# Patient Record
Sex: FEMALE | Race: WHITE | Employment: OTHER | URBAN - METROPOLITAN AREA
[De-identification: names, ages, dates, MRNs, and addresses within clinical notes are randomized per-mention and may not be internally consistent; named-entity substitution may affect disease eponyms.]

---

## 2017-04-18 ENCOUNTER — HOSPITAL ENCOUNTER (OUTPATIENT)
Dept: GENERAL RADIOLOGY | Age: 77
Discharge: HOME | End: 2017-04-18
Payer: MEDICARE

## 2017-04-18 DIAGNOSIS — M05.79 RHEUMATOID ARTHRITIS INVOLVING MULTIPLE SITES WITH POSITIVE RHEUMATOID FACTOR (HCC): ICD-10-CM

## 2017-04-18 DIAGNOSIS — Z79.899 ENCOUNTER FOR LONG-TERM (CURRENT) USE OF OTHER MEDICATIONS: ICD-10-CM

## 2017-04-18 PROCEDURE — 71020 XR CHEST PA LAT: CPT

## 2017-04-18 PROCEDURE — 73120 X-RAY EXAM OF HAND: CPT

## 2018-03-22 ENCOUNTER — HOSPITAL ENCOUNTER (EMERGENCY)
Age: 78
Discharge: HOME | End: 2018-03-22
Attending: EMERGENCY MEDICINE
Payer: MEDICARE

## 2018-03-22 VITALS
TEMPERATURE: 98.2 F | DIASTOLIC BLOOD PRESSURE: 91 MMHG | RESPIRATION RATE: 18 BRPM | OXYGEN SATURATION: 95 % | WEIGHT: 156 LBS | SYSTOLIC BLOOD PRESSURE: 175 MMHG | HEART RATE: 92 BPM | HEIGHT: 60 IN | BODY MASS INDEX: 30.63 KG/M2

## 2018-03-22 DIAGNOSIS — R79.89 ELEVATED SERUM CREATININE: ICD-10-CM

## 2018-03-22 DIAGNOSIS — R80.9 PROTEINURIA, UNSPECIFIED TYPE: ICD-10-CM

## 2018-03-22 DIAGNOSIS — I10 ELEVATED BLOOD PRESSURE READING WITH DIAGNOSIS OF HYPERTENSION: ICD-10-CM

## 2018-03-22 DIAGNOSIS — N30.01 ACUTE CYSTITIS WITH HEMATURIA: Primary | ICD-10-CM

## 2018-03-22 LAB
ANION GAP SERPL CALC-SCNC: 7 MMOL/L (ref 3–18)
APPEARANCE UR: CLEAR
BACTERIA URNS QL MICRO: ABNORMAL /HPF
BASOPHILS # BLD: 0 K/UL (ref 0–0.1)
BASOPHILS NFR BLD: 0 % (ref 0–2)
BILIRUB UR QL: NEGATIVE
BUN SERPL-MCNC: 16 MG/DL (ref 7–18)
BUN/CREAT SERPL: 28 (ref 12–20)
CALCIUM SERPL-MCNC: 9.2 MG/DL (ref 8.5–10.1)
CHLORIDE SERPL-SCNC: 109 MMOL/L (ref 100–108)
CO2 SERPL-SCNC: 26 MMOL/L (ref 21–32)
COLOR UR: YELLOW
CREAT SERPL-MCNC: 0.57 MG/DL (ref 0.6–1.3)
DIFFERENTIAL METHOD BLD: NORMAL
EOSINOPHIL # BLD: 0.1 K/UL (ref 0–0.4)
EOSINOPHIL NFR BLD: 3 % (ref 0–5)
EPITH CASTS URNS QL MICRO: ABNORMAL /LPF (ref 0–5)
ERYTHROCYTE [DISTWIDTH] IN BLOOD BY AUTOMATED COUNT: 13.7 % (ref 11.6–14.5)
GLUCOSE SERPL-MCNC: 85 MG/DL (ref 74–99)
GLUCOSE UR STRIP.AUTO-MCNC: NEGATIVE MG/DL
HCT VFR BLD AUTO: 42.5 % (ref 35–45)
HGB BLD-MCNC: 13.6 G/DL (ref 12–16)
HGB UR QL STRIP: ABNORMAL
KETONES UR QL STRIP.AUTO: NEGATIVE MG/DL
LEUKOCYTE ESTERASE UR QL STRIP.AUTO: ABNORMAL
LYMPHOCYTES # BLD: 1.1 K/UL (ref 0.9–3.6)
LYMPHOCYTES NFR BLD: 21 % (ref 21–52)
MCH RBC QN AUTO: 29.6 PG (ref 24–34)
MCHC RBC AUTO-ENTMCNC: 32 G/DL (ref 31–37)
MCV RBC AUTO: 92.4 FL (ref 74–97)
MONOCYTES # BLD: 0.4 K/UL (ref 0.05–1.2)
MONOCYTES NFR BLD: 7 % (ref 3–10)
NEUTS SEG # BLD: 3.6 K/UL (ref 1.8–8)
NEUTS SEG NFR BLD: 69 % (ref 40–73)
NITRITE UR QL STRIP.AUTO: NEGATIVE
PH UR STRIP: 5 [PH] (ref 5–8)
PLATELET # BLD AUTO: 259 K/UL (ref 135–420)
PMV BLD AUTO: 10.2 FL (ref 9.2–11.8)
POTASSIUM SERPL-SCNC: 3.9 MMOL/L (ref 3.5–5.5)
PROT UR STRIP-MCNC: ABNORMAL MG/DL
RBC # BLD AUTO: 4.6 M/UL (ref 4.2–5.3)
RBC #/AREA URNS HPF: ABNORMAL /HPF (ref 0–5)
SODIUM SERPL-SCNC: 142 MMOL/L (ref 136–145)
SP GR UR REFRACTOMETRY: 1.01 (ref 1–1.03)
UROBILINOGEN UR QL STRIP.AUTO: 0.2 EU/DL (ref 0.2–1)
WBC # BLD AUTO: 5.3 K/UL (ref 4.6–13.2)
WBC URNS QL MICRO: ABNORMAL /HPF (ref 0–4)

## 2018-03-22 PROCEDURE — 85025 COMPLETE CBC W/AUTO DIFF WBC: CPT | Performed by: PHYSICIAN ASSISTANT

## 2018-03-22 PROCEDURE — 81001 URINALYSIS AUTO W/SCOPE: CPT | Performed by: PHYSICIAN ASSISTANT

## 2018-03-22 PROCEDURE — 80048 BASIC METABOLIC PNL TOTAL CA: CPT | Performed by: EMERGENCY MEDICINE

## 2018-03-22 PROCEDURE — 99284 EMERGENCY DEPT VISIT MOD MDM: CPT

## 2018-03-22 PROCEDURE — 93005 ELECTROCARDIOGRAM TRACING: CPT

## 2018-03-22 PROCEDURE — 74011250637 HC RX REV CODE- 250/637: Performed by: PHYSICIAN ASSISTANT

## 2018-03-22 RX ORDER — DILTIAZEM HYDROCHLORIDE 240 MG/1
240 CAPSULE, COATED, EXTENDED RELEASE ORAL DAILY
Status: DISCONTINUED | OUTPATIENT
Start: 2018-03-22 | End: 2018-03-22

## 2018-03-22 RX ORDER — NITROFURANTOIN MACROCRYSTALS 50 MG/1
100 CAPSULE ORAL
Status: COMPLETED | OUTPATIENT
Start: 2018-03-22 | End: 2018-03-22

## 2018-03-22 RX ORDER — NITROFURANTOIN 25; 75 MG/1; MG/1
100 CAPSULE ORAL 2 TIMES DAILY
Qty: 10 CAP | Refills: 0 | Status: SHIPPED | OUTPATIENT
Start: 2018-03-22 | End: 2018-03-27

## 2018-03-22 RX ORDER — DILTIAZEM HYDROCHLORIDE 240 MG/1
240 CAPSULE, COATED, EXTENDED RELEASE ORAL
Status: COMPLETED | OUTPATIENT
Start: 2018-03-22 | End: 2018-03-22

## 2018-03-22 RX ADMIN — NITROFURANTOIN MACROCRYSTALS 100 MG: 50 CAPSULE ORAL at 13:00

## 2018-03-22 RX ADMIN — DILTIAZEM HYDROCHLORIDE 240 MG: 240 CAPSULE, COATED, EXTENDED RELEASE ORAL at 13:01

## 2018-03-22 NOTE — ED PROVIDER NOTES
EMERGENCY DEPARTMENT HISTORY AND PHYSICAL EXAM    Date: 3/22/2018  Patient Name: Natalie Gee    History of Presenting Illness     Chief Complaint   Patient presents with    Blood in Urine         History Provided By: Patient    Chief Complaint: Hematuria  Duration: 2 Days  Timing:  Constant  Location:   Quality: Painless. Severity: N/A  Modifying Factors: None. Associated Symptoms: urinary frequency, urgency. Additional History (Context): Pato George is a 66 y.o. female with HTN, colon cancer, hyperlipidemia, anemia, COPD who presents with 2 days of painless hematuria with urinary frequency and urgency. No dysuria, flank pain, n/v, fevers or chills. Prior h/o nephrolithiasis which was very painful unlike current episode. Reports two discrete episodes of brief light headedness, once yesterday and once today when ambulating from the stretcher. Denies CP, SOB. H/O HTN, on medications but did not take them as she came to the ED. PCP: Nakul Nayak MD    Current Facility-Administered Medications   Medication Dose Route Frequency Provider Last Rate Last Dose    dilTIAZem CD (CARDIZEM CD) capsule 240 mg  240 mg Oral NOW Ida Bradley PA-C        nitrofurantoin (MACRODANTIN) capsule 100 mg  100 mg Oral NOW Jovany Stevens PA-C         Current Outpatient Prescriptions   Medication Sig Dispense Refill    nitrofurantoin, macrocrystal-monohydrate, (MACROBID) 100 mg capsule Take 1 Cap by mouth two (2) times a day for 5 days. 10 Cap 0    nystatin (MYCOSTATIN) powder Apply  to affected area four (4) times daily. 1 Bottle 4    WELCHOL 3.75 gram pwpk powder packet       CARTIA  mg ER capsule       loperamide (IMODIUM) 2 mg capsule       nystatin (MYCOSTATIN) powder Apply  to affected area four (4) times daily. 1 Bottle 2    nystatin (MYCOSTATIN) powder Apply  to affected area four (4) times daily. 1 Bottle 2    aspirin 81 mg chewable tablet Take 1 tablet by mouth daily.  30 tablet 0    tamsulosin (FLOMAX) 0.4 mg capsule Take 1 capsule by mouth daily. 14 capsule 0    acetaminophen (TYLENOL) 325 mg tablet Take 2 tablets by mouth every four (4) hours as needed. 30 tablet 0    pravastatin (PRAVACHOL) 40 mg tablet Take 80 mg by mouth daily.  budesonide-formoterol (SYMBICORT) 80-4.5 mcg/actuation HFAA inhaler Take 2 puffs by inhalation two (2) times a day.  albuterol (PROVENTIL VENTOLIN) 2.5 mg /3 mL (0.083 %) nebulizer solution Take  by inhalation every four (4) hours as needed. Past History     Past Medical History:  Past Medical History:   Diagnosis Date    Anemia     Bleeding     Cancer ([facility] [address]     Colon Cancer: Surgery 6/2013    COPD (chronic obstructive pulmonary disease) (HCC)     Essential (primary) hypertension     High cholesterol     Hyperlipidemia     Other ill-defined conditions(799.89)     internal bleeding per pt    SOB (shortness of breath)     Stool color black        Past Surgical History:  Past Surgical History:   Procedure Laterality Date    ENDOSCOPY, COLON, DIAGNOSTIC      HX COLECTOMY      lap colon resection 6/4/13    HX HEENT      cataract rt and lt    HX ORTHOPAEDIC      rotator cuff repair to rt shoulder    HX OTHER SURGICAL      susan cataract sx    HX UROLOGICAL      bladder tack       Family History:  Family History   Problem Relation Age of Onset    Heart Disease Mother     Heart Attack Mother     Heart Disease Father     Diabetes Father     Cancer Brother     Heart Attack Brother        Social History:  Social History   Substance Use Topics    Smoking status: Former Smoker     Quit date: 5/15/2008    Smokeless tobacco: Never Used    Alcohol use No       Allergies:  No Known Allergies      Review of Systems   Review of Systems   Constitutional: Negative for appetite change, chills and fever. HENT: Negative for sore throat. Eyes: Negative for visual disturbance. Respiratory: Negative for shortness of breath.     Cardiovascular: Negative for chest pain. Gastrointestinal: Negative for abdominal pain, nausea and vomiting. Genitourinary: Positive for frequency, hematuria and urgency. Negative for dysuria and flank pain. Musculoskeletal: Negative for myalgias. Skin: Negative for pallor. Neurological: Positive for light-headedness. Negative for headaches. All Other Systems Negative  Physical Exam     Vitals:    03/22/18 0840 03/22/18 0853 03/22/18 1011   BP: (!) 214/88  140/85   Pulse: 91     Resp: 18     Temp: 98.2 °F (36.8 °C)     SpO2: 96% 98%    Weight: 70.8 kg (156 lb)     Height: 5' (1.524 m)       Physical Exam   Constitutional: She is oriented to person, place, and time. She appears well-developed and well-nourished. No distress. HENT:   Head: Normocephalic and atraumatic. Right Ear: External ear normal.   Left Ear: External ear normal.   Nose: Nose normal.   Eyes: Conjunctivae are normal.   Neck: Normal range of motion. Cardiovascular: Normal rate and regular rhythm. Pulmonary/Chest: Effort normal and breath sounds normal. No respiratory distress. Abdominal: Soft. Bowel sounds are normal. She exhibits no distension. There is no tenderness. There is no rebound and no guarding. Neurological: She is alert and oriented to person, place, and time. CN II-XII grossly intact. Normal heel to shin. Acting appropriately and follows commands. BUE and BLE strength equal and symmetric. Skin: Skin is warm and dry. She is not diaphoretic. Psychiatric: She has a normal mood and affect.  Her behavior is normal.        Diagnostic Study Results     Labs -     Recent Results (from the past 12 hour(s))   URINALYSIS W/ RFLX MICROSCOPIC    Collection Time: 03/22/18  8:45 AM   Result Value Ref Range    Color YELLOW      Appearance CLEAR      Specific gravity 1.006 1.005 - 1.030      pH (UA) 5.0 5.0 - 8.0      Protein TRACE (A) NEG mg/dL    Glucose NEGATIVE  NEG mg/dL    Ketone NEGATIVE  NEG mg/dL    Bilirubin NEGATIVE  NEG Blood LARGE (A) NEG      Urobilinogen 0.2 0.2 - 1.0 EU/dL    Nitrites NEGATIVE  NEG      Leukocyte Esterase TRACE (A) NEG     URINE MICROSCOPIC ONLY    Collection Time: 03/22/18  8:45 AM   Result Value Ref Range    WBC 0 to 4 0 - 4 /hpf    RBC TOO NUMEROUS TO COUNT 0 - 5 /hpf    Epithelial cells FEW 0 - 5 /lpf    Bacteria FEW (A) NEG /hpf   CBC WITH AUTOMATED DIFF    Collection Time: 03/22/18  9:30 AM   Result Value Ref Range    WBC 5.3 4.6 - 13.2 K/uL    RBC 4.60 4.20 - 5.30 M/uL    HGB 13.6 12.0 - 16.0 g/dL    HCT 42.5 35.0 - 45.0 %    MCV 92.4 74.0 - 97.0 FL    MCH 29.6 24.0 - 34.0 PG    MCHC 32.0 31.0 - 37.0 g/dL    RDW 13.7 11.6 - 14.5 %    PLATELET 070 274 - 023 K/uL    MPV 10.2 9.2 - 11.8 FL    NEUTROPHILS 69 40 - 73 %    LYMPHOCYTES 21 21 - 52 %    MONOCYTES 7 3 - 10 %    EOSINOPHILS 3 0 - 5 %    BASOPHILS 0 0 - 2 %    ABS. NEUTROPHILS 3.6 1.8 - 8.0 K/UL    ABS. LYMPHOCYTES 1.1 0.9 - 3.6 K/UL    ABS. MONOCYTES 0.4 0.05 - 1.2 K/UL    ABS. EOSINOPHILS 0.1 0.0 - 0.4 K/UL    ABS.  BASOPHILS 0.0 0.0 - 0.1 K/UL    DF AUTOMATED     METABOLIC PANEL, BASIC    Collection Time: 03/22/18 10:30 AM   Result Value Ref Range    Sodium 142 136 - 145 mmol/L    Potassium 3.9 3.5 - 5.5 mmol/L    Chloride 109 (H) 100 - 108 mmol/L    CO2 26 21 - 32 mmol/L    Anion gap 7 3.0 - 18 mmol/L    Glucose 85 74 - 99 mg/dL    BUN 16 7.0 - 18 MG/DL    Creatinine 0.57 (L) 0.6 - 1.3 MG/DL    BUN/Creatinine ratio 28 (H) 12 - 20      GFR est AA >60 >60 ml/min/1.73m2    GFR est non-AA >60 >60 ml/min/1.73m2    Calcium 9.2 8.5 - 10.1 MG/DL   EKG, 12 LEAD, INITIAL    Collection Time: 03/22/18 11:26 AM   Result Value Ref Range    Ventricular Rate 82 BPM    Atrial Rate 82 BPM    P-R Interval 130 ms    QRS Duration 68 ms    Q-T Interval 374 ms    QTC Calculation (Bezet) 436 ms    Calculated P Axis 46 degrees    Calculated R Axis 15 degrees    Calculated T Axis 54 degrees    Diagnosis       Normal sinus rhythm  Normal ECG  When compared with ECG of 22-AUG-2014 06:56,  QT has shortened         Radiologic Studies -   No orders to display     CT Results  (Last 48 hours)    None        CXR Results  (Last 48 hours)    None            Medical Decision Making   I am the first provider for this patient. I reviewed the vital signs, available nursing notes, past medical history, past surgical history, family history and social history. Vital Signs-Reviewed the patient's vital signs. Pulse Oximetry Analysis - 98% on RA  EKG: Interpreted by the EP Dr. Carollynn Schilder. Time Interpreted: 9305. Rate: 82.   Rhythm: NSR. Interpretation: No STEMI. Records Reviewed: Nursing Notes, Old Medical Records and Previous Laboratory Studies    Procedures:  Procedures    Provider Notes (Medical Decision Making): Pt with hematuria, urinary frequency and urgency x 2 days. UA indicative of infection. Afebrile, no leukocytosis, no CVA tenderness. Will treat and advise PCP f/u. Pt with 2 episodes of light headedness, last when ambulating off stretcher. Non-focal neurologic exam. H/H stable. EKG, BMP pending. 11:37 AM BMP without electrolyte derangement. Pt initially hypertensive at 214/88, improved to 140/85 without intervention. She did not take her morning meds. Will advise she resume all meds as previously prescribed and advise PCP follow up for further eval.     11:37 AM Pt reassessed. She is still pain free. No light headedness since being in the ED. Will discharge to home with abx for acute cystitis with hematuria and advise PCP, urology follow up. MED RECONCILIATION:  Current Facility-Administered Medications   Medication Dose Route Frequency    dilTIAZem CD (CARDIZEM CD) capsule 240 mg  240 mg Oral NOW    nitrofurantoin (MACRODANTIN) capsule 100 mg  100 mg Oral NOW     Current Outpatient Prescriptions   Medication Sig    nitrofurantoin, macrocrystal-monohydrate, (MACROBID) 100 mg capsule Take 1 Cap by mouth two (2) times a day for 5 days.     nystatin (MYCOSTATIN) powder Apply  to affected area four (4) times daily.  WELCHOL 3.75 gram pwpk powder packet     CARTIA  mg ER capsule     loperamide (IMODIUM) 2 mg capsule     nystatin (MYCOSTATIN) powder Apply  to affected area four (4) times daily.  nystatin (MYCOSTATIN) powder Apply  to affected area four (4) times daily.  aspirin 81 mg chewable tablet Take 1 tablet by mouth daily.  tamsulosin (FLOMAX) 0.4 mg capsule Take 1 capsule by mouth daily.  acetaminophen (TYLENOL) 325 mg tablet Take 2 tablets by mouth every four (4) hours as needed.  pravastatin (PRAVACHOL) 40 mg tablet Take 80 mg by mouth daily.  budesonide-formoterol (SYMBICORT) 80-4.5 mcg/actuation HFAA inhaler Take 2 puffs by inhalation two (2) times a day.  albuterol (PROVENTIL VENTOLIN) 2.5 mg /3 mL (0.083 %) nebulizer solution Take  by inhalation every four (4) hours as needed. Disposition:  Discharge. DISCHARGE NOTE:   11:37 AM  Pt has been reexamined. Patient has no new complaints, changes, or physical findings. BP still elevated, will give anti-hypertensive here prior to discharge, she was advised to not take her medicine today when she gets home for such. Will give first dose abx here. Care plan outlined and precautions discussed. Results of UA, labs, EKG were reviewed with the patient. All medications were reviewed with the patient; will d/c home with Macrobid. All of pt's questions and concerns were addressed. Patient was instructed and agrees to follow up with PCP and urologist, as well as to return to the ED upon further deterioration. She has an appointment with PCP 4/3/2018. Patient is ready to go home. Follow-up Information     Follow up With Details Comments Alva Alicea MD Call today to make a follow up appointment early next week.  [address] [facility] EMERGENCY DEPT  As needed, If symptoms worsen [address], MD  For further evaluation of blood in urine, As needed [address] [facility]  [address]  [place] [address]  [phone]            Current Discharge Medication List      START taking these medications    Details   nitrofurantoin, macrocrystal-monohydrate, (MACROBID) 100 mg capsule Take 1 Cap by mouth two (2) times a day for 5 days. Qty: 10 Cap, Refills: 0             Diagnosis     Clinical Impression:   1. Acute cystitis with hematuria    2. Elevated blood pressure reading with diagnosis of hypertension    3. Proteinuria, unspecified type    4.  Elevated serum creatinine      Charlotte Haque PA-C 11:36 AM

## 2018-03-22 NOTE — ED TRIAGE NOTES
The patient presents for evaluation of hematuria that began last night. She is also complaining of lower back pain.

## 2018-03-23 LAB
ATRIAL RATE: 82 BPM
CALCULATED P AXIS, ECG09: 46 DEGREES
CALCULATED R AXIS, ECG10: 15 DEGREES
CALCULATED T AXIS, ECG11: 54 DEGREES
DIAGNOSIS, 93000: NORMAL
P-R INTERVAL, ECG05: 130 MS
Q-T INTERVAL, ECG07: 374 MS
QRS DURATION, ECG06: 68 MS
QTC CALCULATION (BEZET), ECG08: 436 MS
VENTRICULAR RATE, ECG03: 82 BPM

## 2018-06-26 ENCOUNTER — HOSPITAL ENCOUNTER (EMERGENCY)
Age: 78
Discharge: HOME | End: 2018-06-26
Attending: EMERGENCY MEDICINE
Payer: MEDICARE

## 2018-06-26 ENCOUNTER — APPOINTMENT (OUTPATIENT)
Dept: GENERAL RADIOLOGY | Age: 78
End: 2018-06-26
Attending: STUDENT IN AN ORGANIZED HEALTH CARE EDUCATION/TRAINING PROGRAM
Payer: MEDICARE

## 2018-06-26 ENCOUNTER — APPOINTMENT (OUTPATIENT)
Dept: CT IMAGING | Age: 78
End: 2018-06-26
Attending: EMERGENCY MEDICINE
Payer: MEDICARE

## 2018-06-26 VITALS
TEMPERATURE: 97 F | RESPIRATION RATE: 28 BRPM | BODY MASS INDEX: 27.42 KG/M2 | HEIGHT: 59 IN | HEART RATE: 102 BPM | WEIGHT: 136 LBS | OXYGEN SATURATION: 90 % | SYSTOLIC BLOOD PRESSURE: 139 MMHG | DIASTOLIC BLOOD PRESSURE: 64 MMHG

## 2018-06-26 DIAGNOSIS — R06.02 SOB (SHORTNESS OF BREATH): Primary | ICD-10-CM

## 2018-06-26 LAB
ANION GAP SERPL CALC-SCNC: 7 MMOL/L (ref 3–18)
ATRIAL RATE: 75 BPM
BASOPHILS # BLD: 0.2 K/UL (ref 0–0.06)
BASOPHILS NFR BLD: 1 % (ref 0–3)
BUN SERPL-MCNC: 18 MG/DL (ref 7–18)
BUN/CREAT SERPL: 14 (ref 12–20)
CALCIUM SERPL-MCNC: 9.5 MG/DL (ref 8.5–10.1)
CALCULATED P AXIS, ECG09: 36 DEGREES
CALCULATED R AXIS, ECG10: 26 DEGREES
CALCULATED T AXIS, ECG11: 58 DEGREES
CHLORIDE SERPL-SCNC: 102 MMOL/L (ref 100–108)
CK MB CFR SERPL CALC: 2.3 % (ref 0–4)
CK MB SERPL-MCNC: 1.2 NG/ML (ref 5–25)
CK SERPL-CCNC: 53 U/L (ref 26–192)
CO2 SERPL-SCNC: 27 MMOL/L (ref 21–32)
CREAT SERPL-MCNC: 1.25 MG/DL (ref 0.6–1.3)
D DIMER PPP FEU-MCNC: 1.37 UG/ML(FEU)
DIAGNOSIS, 93000: NORMAL
DIFFERENTIAL METHOD BLD: ABNORMAL
EOSINOPHIL # BLD: 0 K/UL (ref 0–0.4)
EOSINOPHIL NFR BLD: 0 % (ref 0–5)
ERYTHROCYTE [DISTWIDTH] IN BLOOD BY AUTOMATED COUNT: 13.8 % (ref 11.6–14.5)
GLUCOSE SERPL-MCNC: 124 MG/DL (ref 74–99)
HCT VFR BLD AUTO: 33.4 % (ref 35–45)
HGB BLD-MCNC: 10.8 G/DL (ref 12–16)
LYMPHOCYTES # BLD: 1.1 K/UL (ref 0.8–3.5)
LYMPHOCYTES NFR BLD: 7 % (ref 20–51)
MCH RBC QN AUTO: 28.7 PG (ref 24–34)
MCHC RBC AUTO-ENTMCNC: 32.3 G/DL (ref 31–37)
MCV RBC AUTO: 88.8 FL (ref 74–97)
MONOCYTES # BLD: 0.6 K/UL (ref 0–1)
MONOCYTES NFR BLD: 4 % (ref 2–9)
NEUTS BAND NFR BLD MANUAL: 18 % (ref 0–5)
NEUTS SEG # BLD: 14.2 K/UL (ref 1.8–8)
NEUTS SEG NFR BLD: 70 % (ref 42–75)
P-R INTERVAL, ECG05: 134 MS
PLATELET # BLD AUTO: 281 K/UL (ref 135–420)
PLATELET COMMENTS,PCOM: ABNORMAL
PMV BLD AUTO: 9.6 FL (ref 9.2–11.8)
POTASSIUM SERPL-SCNC: 4.1 MMOL/L (ref 3.5–5.5)
Q-T INTERVAL, ECG07: 380 MS
QRS DURATION, ECG06: 68 MS
QTC CALCULATION (BEZET), ECG08: 424 MS
RBC # BLD AUTO: 3.76 M/UL (ref 4.2–5.3)
RBC MORPH BLD: ABNORMAL
SODIUM SERPL-SCNC: 136 MMOL/L (ref 136–145)
TROPONIN I SERPL-MCNC: <0.02 NG/ML (ref 0–0.04)
VENTRICULAR RATE, ECG03: 75 BPM
WBC # BLD AUTO: 16.1 K/UL (ref 4.6–13.2)

## 2018-06-26 PROCEDURE — 71046 X-RAY EXAM CHEST 2 VIEWS: CPT

## 2018-06-26 PROCEDURE — 71275 CT ANGIOGRAPHY CHEST: CPT

## 2018-06-26 PROCEDURE — 74011250636 HC RX REV CODE- 250/636: Performed by: STUDENT IN AN ORGANIZED HEALTH CARE EDUCATION/TRAINING PROGRAM

## 2018-06-26 PROCEDURE — 99284 EMERGENCY DEPT VISIT MOD MDM: CPT

## 2018-06-26 PROCEDURE — 74011636320 HC RX REV CODE- 636/320: Performed by: EMERGENCY MEDICINE

## 2018-06-26 PROCEDURE — 93005 ELECTROCARDIOGRAM TRACING: CPT

## 2018-06-26 PROCEDURE — 85379 FIBRIN DEGRADATION QUANT: CPT

## 2018-06-26 PROCEDURE — 82553 CREATINE MB FRACTION: CPT

## 2018-06-26 PROCEDURE — 74011000250 HC RX REV CODE- 250: Performed by: STUDENT IN AN ORGANIZED HEALTH CARE EDUCATION/TRAINING PROGRAM

## 2018-06-26 PROCEDURE — 85025 COMPLETE CBC W/AUTO DIFF WBC: CPT

## 2018-06-26 PROCEDURE — 96374 THER/PROPH/DIAG INJ IV PUSH: CPT

## 2018-06-26 PROCEDURE — 80048 BASIC METABOLIC PNL TOTAL CA: CPT

## 2018-06-26 RX ADMIN — IOPAMIDOL 48 ML: 755 INJECTION, SOLUTION INTRAVENOUS at 16:31

## 2018-06-26 RX ADMIN — WATER 2 G: 1 INJECTION INTRAMUSCULAR; INTRAVENOUS; SUBCUTANEOUS at 17:02

## 2018-06-26 NOTE — ED PROVIDER NOTES
EMERGENCY DEPARTMENT HISTORY AND PHYSICAL EXAM      Date: 6/26/2018  Patient Name: Eunice Sevilla    History of Presenting Illness     Chief Complaint   Patient presents with    Abnormal Lab Results    Cough         History Provided By: Patient    Chief Complaint: Shortness of breath, cough  Duration: 1 week  Timing:  Progressive  Location: n/a  Quality: n/a  Severity: Moderate  Modifying Factors: none  Associated Symptoms: poor appetite, fevers and chills      Additional History (Context): Jamia Win is a 66 y.o. female with diabetes, hypertension, hyperlipidemia and COPD who presents from [facility] complaining of cough and SOB. Hx is limited as pt is poor historian and unsure of details, which are partially supplemented by [facility] documentation:   - pt reports a cough for 5 days, was seen at [facility] 2 days ago and rx with z-pack for possible PNA. - no PNA seen on CXR, however pt encuoraged to continue z-pack  - patient was feeling SOB yesterday and this morning when ambulating from bathroom to bedroom, and called [facility], which instructed pt to return  - pt O2 levels at 92% on initial visit, 88-94% on today's visit so they referred pt to ED        PCP: Scot Pearson MD    Current Facility-Administered Medications   Medication Dose Route Frequency Provider Last Rate Last Dose    cefTRIAXone (ROCEPHIN) 2 g in sterile water (preservative free) 20 mL IV syringe  2 g IntraVENous NOW Jesus Guerra MD         Current Outpatient Prescriptions   Medication Sig Dispense Refill    nystatin (MYCOSTATIN) powder Apply  to affected area four (4) times daily. 1 Bottle 4    WELCHOL 3.75 gram pwpk powder packet       CARTIA  mg ER capsule       loperamide (IMODIUM) 2 mg capsule       nystatin (MYCOSTATIN) powder Apply  to affected area four (4) times daily. 1 Bottle 2    nystatin (MYCOSTATIN) powder Apply  to affected area four (4) times daily. 1 Bottle 2    aspirin 81 mg chewable tablet Take 1 tablet by mouth daily.  27 tablet 0    tamsulosin (FLOMAX) 0.4 mg capsule Take 1 capsule by mouth daily. 14 capsule 0    acetaminophen (TYLENOL) 325 mg tablet Take 2 tablets by mouth every four (4) hours as needed. 30 tablet 0    pravastatin (PRAVACHOL) 40 mg tablet Take 80 mg by mouth daily.  budesonide-formoterol (SYMBICORT) 80-4.5 mcg/actuation HFAA inhaler Take 2 puffs by inhalation two (2) times a day.  albuterol (PROVENTIL VENTOLIN) 2.5 mg /3 mL (0.083 %) nebulizer solution Take  by inhalation every four (4) hours as needed. Past History     Past Medical History:  Past Medical History:   Diagnosis Date    Anemia     Bleeding     Cancer ([facility] [address]     Colon Cancer: Surgery 6/2013    COPD (chronic obstructive pulmonary disease) (HCC)     Essential (primary) hypertension     High cholesterol     Hyperlipidemia     Other ill-defined conditions(799.89)     internal bleeding per pt    SOB (shortness of breath)     Stool color black        Past Surgical History:  Past Surgical History:   Procedure Laterality Date    ENDOSCOPY, COLON, DIAGNOSTIC      HX COLECTOMY      lap colon resection 6/4/13    HX HEENT      cataract rt and lt    HX ORTHOPAEDIC      rotator cuff repair to rt shoulder    HX OTHER SURGICAL      susan cataract sx    HX UROLOGICAL      bladder tack       Family History:  Family History   Problem Relation Age of Onset    Heart Disease Mother     Heart Attack Mother     Heart Disease Father     Diabetes Father     Cancer Brother     Heart Attack Brother        Social History:  Social History   Substance Use Topics    Smoking status: Former Smoker     Quit date: 5/15/2008    Smokeless tobacco: Never Used    Alcohol use No       Allergies:  No Known Allergies      Review of Systems   Review of Systems   Constitutional: Positive for chills, fatigue and fever. HENT: Negative for congestion, rhinorrhea and sore throat. Eyes: Negative for photophobia and visual disturbance.    Respiratory: Positive for cough and shortness of breath. Negative for chest tightness. + hemoptysis several days ago   Cardiovascular: Negative for chest pain and leg swelling. Gastrointestinal: Negative for abdominal pain, constipation and diarrhea. Endocrine: Negative for polydipsia and polyphagia. Genitourinary: Negative for dysuria and flank pain. Musculoskeletal: Negative for arthralgias and myalgias. Skin: Negative for rash and wound. Neurological: Negative for dizziness, light-headedness and headaches. Psychiatric/Behavioral: Negative for agitation and confusion. Physical Exam     Vitals:    06/26/18 1300 06/26/18 1315 06/26/18 1330 06/26/18 1345   BP: 125/56 119/59 122/65 118/59   Pulse: 76 77 75 76   Resp: 26 27 21 21   Temp:       SpO2: 92% 92% 97% 95%   Weight:       Height:         Physical Exam   Constitutional: She appears well-nourished. No distress. HENT:   Head: Atraumatic. Mouth/Throat: Oropharynx is clear and moist.   Eyes: Conjunctivae and EOM are normal. Pupils are equal, round, and reactive to light. Neck: Normal range of motion. Neck supple. Cardiovascular: Normal rate and normal heart sounds. No murmur heard. Pulmonary/Chest: Breath sounds normal. No respiratory distress. She has no wheezes. She has no rales. Abdominal: Soft. She exhibits no distension. There is no tenderness. Musculoskeletal: Normal range of motion. She exhibits no edema or tenderness. Neurological: She is alert. No cranial nerve deficit. Coordination normal.   Skin: Skin is warm and dry. No rash noted. She is not diaphoretic. Psychiatric: She has a normal mood and affect.  Her behavior is normal. Judgment and thought content normal.         Diagnostic Study Results     Labs -     Recent Results (from the past 12 hour(s))   CBC WITH AUTOMATED DIFF    Collection Time: 06/26/18  1:08 PM   Result Value Ref Range    WBC 16.1 (H) 4.6 - 13.2 K/uL    RBC 3.76 (L) 4.20 - 5.30 M/uL    HGB 10.8 (L) 12.0 - 16.0 g/dL    HCT 33.4 (L) 35.0 - 45.0 %    MCV 88.8 74.0 - 97.0 FL    MCH 28.7 24.0 - 34.0 PG    MCHC 32.3 31.0 - 37.0 g/dL    RDW 13.8 11.6 - 14.5 %    PLATELET 597 333 - 208 K/uL    MPV 9.6 9.2 - 11.8 FL    NEUTROPHILS 70 42 - 75 %    BAND NEUTROPHILS 18 (H) 0 - 5 %    LYMPHOCYTES 7 (L) 20 - 51 %    MONOCYTES 4 2 - 9 %    EOSINOPHILS 0 0 - 5 %    BASOPHILS 1 0 - 3 %    ABS. NEUTROPHILS 14.2 (H) 1.8 - 8.0 K/UL    ABS. LYMPHOCYTES 1.1 0.8 - 3.5 K/UL    ABS. MONOCYTES 0.6 0 - 1.0 K/UL    ABS. EOSINOPHILS 0.0 0.0 - 0.4 K/UL    ABS. BASOPHILS 0.2 (H) 0.0 - 0.06 K/UL    DF MANUAL      PLATELET COMMENTS ADEQUATE PLATELETS      RBC COMMENTS NORMOCYTIC, NORMOCHROMIC     METABOLIC PANEL, BASIC    Collection Time: 06/26/18  1:08 PM   Result Value Ref Range    Sodium 136 136 - 145 mmol/L    Potassium 4.1 3.5 - 5.5 mmol/L    Chloride 102 100 - 108 mmol/L    CO2 27 21 - 32 mmol/L    Anion gap 7 3.0 - 18 mmol/L    Glucose 124 (H) 74 - 99 mg/dL    BUN 18 7.0 - 18 MG/DL    Creatinine 1.25 0.6 - 1.3 MG/DL    BUN/Creatinine ratio 14 12 - 20      GFR est AA 50 (L) >60 ml/min/1.73m2    GFR est non-AA 41 (L) >60 ml/min/1.73m2    Calcium 9.5 8.5 - 10.1 MG/DL   D DIMER    Collection Time: 06/26/18  1:08 PM   Result Value Ref Range    D DIMER 1.37 (H) <0.46 ug/ml(FEU)   CARDIAC PANEL,(CK, CKMB & TROPONIN)    Collection Time: 06/26/18  1:08 PM   Result Value Ref Range    CK 53 26 - 192 U/L    CK - MB 1.2 <3.6 ng/ml    CK-MB Index 2.3 0.0 - 4.0 %    Troponin-I, Qt. <0.02 0.0 - 0.045 NG/ML       Radiologic Studies -   XR CHEST PA LAT   Final Result      CTA CHEST W OR W WO CONT    (Results Pending)     CT Results  (Last 48 hours)    None        CXR Results  (Last 48 hours)               06/26/18 1209  XR CHEST PA LAT Final result    Impression:  IMPRESSION:       No radiographic demonstration of pneumonia. Small hiatal hernia.        Narrative:  EXAM: CHEST PA AND LATERAL       CLINICAL HISTORY/INDICATION:  shortness of breath with productive cough x5 days,   low oxygen saturation, referred to emergency department from urgent care center        COMPARISON: Chest x-ray April 18, 2017. TECHNIQUE: PA and lateral views obtained. FINDINGS:         The cardiac silhouette is normal. Small hiatal hernia demonstrated. The lungs   are clear. The costophrenic angles are sharply defined. Pulmonary vascularity   is normal. Mild disc space narrowing with marginal spurring involves the mid and   lower thoracic spine. Medical Decision Making   I am the first provider for this patient. I reviewed the vital signs, available nursing notes, past medical history, past surgical history, family history and social history. Assessment: 66 y.o F with hx of COPD, HTN, DM, HLD presenting with several days of worsening shortness of breath on exertion and cough, currently being treated for non-radiographic PNA. Suspect onging bronchitis with possible COPD exacerbation, however will recheck basic labs, CXR, and ECG to r/out ACS or other cardiopulmonary process. Doubt PE in this patient, as she is currently not hypoxic, and with no tachypnea, however in the setting of reported hemoptysis cannot exclude with PERC, and therefore will get d-dimer. Differential Diagnosis:  Asthma, Bronchitis, Pneumonia, PE, PTX, COPD, URI, CHF, ACS    Vital Signs-Reviewed the patient's vital signs. Pulse Oximetry Analysis - 97% on room air    Cardiac Monitor:  Rate: 87   Rhythm: regular, sinus    EKG: Interpreted by the EP. Time Interpreted: 13:55   Rate: 75   Rhythm: sinus, regurar   Interpretation: normal ecg   Comparison: none    Records Reviewed: Nursing Notes and Old Medical Records    Medical Decision Making/ ED Course:     12:45 PM   CXR negative for PNA. Labs pending. 2:46 PM  Labwork with elevated d-dimer, CT ordered.  Normal cardiac enzymes    3:24 PM  Decision made to escalate abx to include ceftriaxone for WBC (16) with bandemia (18), concerning for underlying infectious, possibly PNA not visualized on CXR. Patient care transferred to Dr. Meenakshi Zepeda pending CT for PE rule out. Procedures: None  Procedures      Diagnosis     Clinical Impression:   1.  SOB (shortness of breath)

## 2018-06-26 NOTE — ED NOTES
Assumed care of pt from triage. Pt reports to ED with c/o SOB and productive cough x5 days. Pt stating \"[facility] at [facility] today and they told me my oxygen saturations were low and that I needed to come here. They told me I have pneumonia and gave me a z pack. I've taken two pills already. \" pt speech clear, speaking full sentences, resp even and unlabored, sating 96% on ra. Lung sounds clear to auscultation. A&Ox4. Will continue to monitor and intervene as necessary.

## 2018-06-26 NOTE — DISCHARGE INSTRUCTIONS
Please refer to the attached discharge instructions for further home care information and indications to return to the emergency department. This may include worsening pain, difficulty breathing, persistent fevers, confusion, lethargy, or inability to eat or drink. Please take your home medications as prescribed. If you are being prescribed new medications, please discuss how to properly take your medications with the pharmacist and to review any medication side effects or interactions with medications you are already taking. If you have a primary care physician, please call their office later today to arrange a follow up appointment tomorrow. It is vitally important that you schedule a follow up appointment to ensure that your condition is stable and that you are re-assessed. If at any time you have any concerns or are unable to care for yourself please return to the emergency department. Shortness of Breath: Care Instructions  Your Care Instructions  Shortness of breath has many causes. Sometimes conditions such as anxiety can lead to shortness of breath. Some people get mild shortness of breath when they exercise. Trouble breathing also can be a symptom of a serious problem, such as asthma, lung disease, emphysema, heart problems, and pneumonia. If your shortness of breath continues, you may need tests and treatment. Watch for any changes in your breathing and other symptoms. Follow-up care is a key part of your treatment and safety. Be sure to make and go to all appointments, and call your doctor if you are having problems. It's also a good idea to know your test results and keep a list of the medicines you take. How can you care for yourself at home? · Do not smoke or allow others to smoke around you. If you need help quitting, talk to your doctor about stop-smoking programs and medicines. These can increase your chances of quitting for good. · Get plenty of rest and sleep.   · Take your medicines exactly as prescribed. Call your doctor if you think you are having a problem with your medicine. · Find healthy ways to deal with stress. ¨ Exercise daily. ¨ Get plenty of sleep. ¨ Eat regularly and well. When should you call for help? Call 911 anytime you think you may need emergency care. For example, call if:  ? · You have severe shortness of breath. ? · You have symptoms of a heart attack. These may include:  ¨ Chest pain or pressure, or a strange feeling in the chest.  ¨ Sweating. ¨ Shortness of breath. ¨ Nausea or vomiting. ¨ Pain, pressure, or a strange feeling in the back, neck, jaw, or upper belly or in one or both shoulders or arms. ¨ Lightheadedness or sudden weakness. ¨ A fast or irregular heartbeat. After you call 911, the  may tell you to chew 1 adult-strength or 2 to 4 low-dose aspirin. Wait for an ambulance. Do not try to drive yourself. ?Call your doctor now or seek immediate medical care if:  ? · Your shortness of breath gets worse or you start to wheeze. Wheezing is a high-pitched sound when you breathe. ? · You wake up at night out of breath or have to prop your head up on several pillows to breathe. ? · You are short of breath after only light activity or while at rest.   ? Watch closely for changes in your health, and be sure to contact your doctor if:  ? · You do not get better over the next 1 to 2 days. Where can you learn more? Go to http://kathy-nano.info/. Enter S780 in the search box to learn more about \"Shortness of Breath: Care Instructions. \"  Current as of: May 12, 2017  Content Version: 11.4  © 9132-5129 [employer]. Care instructions adapted under license by [employer] (which disclaims liability or warranty for this information).  If you have questions about a medical condition or this instruction, always ask your healthcare professional. [address]  any warranty or liability for your use of this information.

## 2018-06-26 NOTE — ED NOTES
Pt ambulated once around ED on portable pulse ox, pt sating 90-94%, pt denied SOB until arrival into room.

## 2018-11-13 RX ORDER — LEFLUNOMIDE 20 MG/1
20 TABLET ORAL DAILY
COMMUNITY

## 2018-11-13 RX ORDER — VITAMIN E 1000 UNIT
1000 CAPSULE ORAL DAILY
COMMUNITY

## 2018-11-13 RX ORDER — LOPERAMIDE HYDROCHLORIDE 2 MG/1
CAPSULE ORAL DAILY
COMMUNITY

## 2018-11-13 RX ORDER — CELECOXIB 200 MG/1
CAPSULE ORAL DAILY
COMMUNITY

## 2018-11-13 RX ORDER — GLUCOSAMINE SULFATE 1500 MG
POWDER IN PACKET (EA) ORAL DAILY
COMMUNITY

## 2018-11-14 ENCOUNTER — ANESTHESIA EVENT (OUTPATIENT)
Dept: ENDOSCOPY | Age: 78
End: 2018-11-14
Payer: MEDICARE

## 2018-11-15 ENCOUNTER — ANESTHESIA (OUTPATIENT)
Dept: ENDOSCOPY | Age: 78
End: 2018-11-15
Payer: MEDICARE

## 2018-11-15 ENCOUNTER — HOSPITAL ENCOUNTER (OUTPATIENT)
Age: 78
Setting detail: OUTPATIENT SURGERY
Discharge: HOME | End: 2018-11-15
Attending: INTERNAL MEDICINE | Admitting: INTERNAL MEDICINE
Payer: MEDICARE

## 2018-11-15 VITALS
SYSTOLIC BLOOD PRESSURE: 133 MMHG | WEIGHT: 144 LBS | HEIGHT: 58 IN | TEMPERATURE: 97.8 F | RESPIRATION RATE: 19 BRPM | DIASTOLIC BLOOD PRESSURE: 54 MMHG | HEART RATE: 77 BPM | OXYGEN SATURATION: 94 % | BODY MASS INDEX: 30.23 KG/M2

## 2018-11-15 PROCEDURE — 74011250636 HC RX REV CODE- 250/636: Performed by: NURSE ANESTHETIST, CERTIFIED REGISTERED

## 2018-11-15 PROCEDURE — 76040000019: Performed by: INTERNAL MEDICINE

## 2018-11-15 PROCEDURE — 76060000031 HC ANESTHESIA FIRST 0.5 HR: Performed by: INTERNAL MEDICINE

## 2018-11-15 PROCEDURE — 74011250636 HC RX REV CODE- 250/636

## 2018-11-15 RX ORDER — SODIUM CHLORIDE 0.9 % (FLUSH) 0.9 %
5-10 SYRINGE (ML) INJECTION AS NEEDED
Status: DISCONTINUED | OUTPATIENT
Start: 2018-11-15 | End: 2018-11-15 | Stop reason: HOSPADM

## 2018-11-15 RX ORDER — SODIUM CHLORIDE 0.9 % (FLUSH) 0.9 %
5-10 SYRINGE (ML) INJECTION EVERY 8 HOURS
Status: DISCONTINUED | OUTPATIENT
Start: 2018-11-15 | End: 2018-11-15 | Stop reason: HOSPADM

## 2018-11-15 RX ORDER — SODIUM CHLORIDE, SODIUM LACTATE, POTASSIUM CHLORIDE, CALCIUM CHLORIDE 600; 310; 30; 20 MG/100ML; MG/100ML; MG/100ML; MG/100ML
75 INJECTION, SOLUTION INTRAVENOUS CONTINUOUS
Status: DISCONTINUED | OUTPATIENT
Start: 2018-11-15 | End: 2018-11-15 | Stop reason: HOSPADM

## 2018-11-15 RX ORDER — PROPOFOL 10 MG/ML
INJECTION, EMULSION INTRAVENOUS AS NEEDED
Status: DISCONTINUED | OUTPATIENT
Start: 2018-11-15 | End: 2018-11-15 | Stop reason: HOSPADM

## 2018-11-15 RX ORDER — LIDOCAINE HYDROCHLORIDE 20 MG/ML
INJECTION, SOLUTION EPIDURAL; INFILTRATION; INTRACAUDAL; PERINEURAL AS NEEDED
Status: DISCONTINUED | OUTPATIENT
Start: 2018-11-15 | End: 2018-11-15 | Stop reason: HOSPADM

## 2018-11-15 RX ADMIN — PROPOFOL 30 MG: 10 INJECTION, EMULSION INTRAVENOUS at 10:27

## 2018-11-15 RX ADMIN — SODIUM CHLORIDE, SODIUM LACTATE, POTASSIUM CHLORIDE, AND CALCIUM CHLORIDE: 600; 310; 30; 20 INJECTION, SOLUTION INTRAVENOUS at 10:12

## 2018-11-15 RX ADMIN — LIDOCAINE HYDROCHLORIDE 40 MG: 20 INJECTION, SOLUTION EPIDURAL; INFILTRATION; INTRACAUDAL; PERINEURAL at 10:24

## 2018-11-15 RX ADMIN — PROPOFOL 50 MG: 10 INJECTION, EMULSION INTRAVENOUS at 10:22

## 2018-11-15 RX ADMIN — FAMOTIDINE 20 MG: 10 INJECTION, SOLUTION INTRAVENOUS at 09:34

## 2018-11-15 RX ADMIN — PROPOFOL 20 MG: 10 INJECTION, EMULSION INTRAVENOUS at 10:25

## 2018-11-15 NOTE — PERIOP NOTES
9:36 AM Patient reports drinking cup of black coffee and 2 ginger ales between [id number]. Also patient states she had sip of water at 0815 with her BP medication. Dr. Dodson , anesthesia, aware.

## 2018-11-15 NOTE — DISCHARGE INSTRUCTIONS
DISCHARGE SUMMARY from Nurse     POST-PROCEDURE INSTRUCTIONS:    Call your Physician if you:  ? Observe any excess bleeding. ? Develop a temperature over 100.5o F.  ? Experience abdominal, shoulder or chest pain. ? Notice any signs of decreased circulation or nerve impairment to an extremity such as a change in color, persistent numbness, tingling, coldness or increase in pain. ? Vomit blood or you have nausea and vomiting lasting longer than 4 hours. ? Are unable to take medications. ? Are unable to urinate within 8 hours after discharge following general anesthesia or intravenous sedation. For the next 24 hours after receiving general anesthesia or intravenous sedation, or while taking prescription Narcotics, limit your activities:  ? Do NOT drive a motor vehicle, operate hazard machinery or power tools, or perform tasks that require coordination. The medication you received during your procedure may have some effect on your mental awareness. ? Do NOT make important personal or business decisions. The medication you received during your procedure may have some effect on your mental awareness. ? Do NOT drink alcoholic beverages. These drinks do not mix well with the medications that have been given to you. ? Upon discharge from the hospital, you must be accompanied by a responsible adult. ? Resume your diet as directed by your physician. ? Resume medications as your physician has prescribed. ? Please give a list of your current medications to your Primary Care Provider. ? Please update this list whenever your medications are discontinued, doses are changed, or new medications (including over-the-counter products) are added. ? Please carry medication information at all times in case of emergency situations. These are general instructions for a healthy lifestyle:    No smoking/ No tobacco products/ Avoid exposure to second hand smoke.    Surgeon General's Warning:  Quitting smoking now greatly reduces serious risk to your health. Obesity, smoking, and a sedentary lifestyle greatly increase your risk for illness.  A healthy diet, regular physical exercise & weight monitoring are important for maintaining a healthy lifestyle   You may be retaining fluid if you have a history of heart failure or if you experience any of the following symptoms:  Weight gain of 3 pounds or more overnight or 5 pounds in a week, increased swelling in our hands or feet or shortness of breath while lying flat in bed. Please call your doctor as soon as you notice any of these symptoms; do not wait until your next office visit. Recognize signs and symptoms of STROKE:  F  -  Face looks uneven  A  -  Arms unable to move or move unevenly  S  -  Speech slurred or non-existent  T  -  Time to call 911 - as soon as signs and symptoms begin - DO NOT go back to bed or wait to see If you get better - TIME IS BRAIN. Colorectal Screening   Colorectal cancer almost always develops from precancerous polyps (abnormal growths) in the colon or rectum. Screening tests can find precancerous polyps, so that they can be removed before they turn into cancer. Screening tests can also find colorectal cancer early, when treatment works best.  Nancy Chamorro Speak with your physician about when you should begin screening and how often you should be tested. Colonoscopy: What to Expect at [address] [place] [address]  After you have a colonoscopy, you will stay at the clinic for 1 to 2 hours until the medicines wear off. Then you can go home. But you will need to arrange for a ride. Your doctor will tell you when you can eat and do your other usual activities. Your doctor will talk to you about when you will need your next colonoscopy. Your doctor can help you decide how often you need to be checked. This will depend on the results of your test and your risk for colorectal cancer. After the test, you may be bloated or have gas pains.  You may need to pass gas. If a biopsy was done or a polyp was removed, you may have streaks of blood in your stool (feces) for a few days. This care sheet gives you a general idea about how long it will take for you to recover. But each person recovers at a different pace. Follow the steps below to get better as quickly as possible. How can you care for yourself at home? Activity  · Rest when you feel tired. · You can do your normal activities when it feels okay to do so. Diet  · Follow your doctor's directions for eating. · Unless your doctor has told you not to, drink plenty of fluids. This helps to replace the fluids that were lost during the colon prep. · Do not drink alcohol. Medicines  · If polyps were removed or a biopsy was done during the test, your doctor may tell you not to take aspirin or other anti-inflammatory medicines for a few days. These include ibuprofen (Advil, Motrin) and naproxen (Aleve). Other instructions  · For your safety, do not drive or operate machinery until the medicine wears off and you can think clearly. Your doctor may tell you not to drive or operate machinery until the day after your test.  · Do not sign legal documents or make major decisions until the medicine wears off and you can think clearly. The anesthesia can make it hard for you to fully understand what you are agreeing to. Follow-up care is a key part of your treatment and safety. Be sure to make and go to all appointments, and call your doctor if you are having problems. It's also a good idea to know your test results and keep a list of the medicines you take. When should you call for help? Call 911 anytime you think you may need emergency care. For example, call if:  · You passed out (lost consciousness). · You pass maroon or bloody stools. · You have severe belly pain. Call your doctor now or seek immediate medical care if:  · Your stools are black and tarlike.   · Your stools have streaks of blood, but you did not have a biopsy or any polyps removed. · You have belly pain, or your belly is swollen and firm. · You vomit. · You have a fever. · You are very dizzy. Watch closely for changes in your health, and be sure to contact your doctor if you have any problems. Where can you learn more? Go to APROOFED.be  Enter E264 in the search box to learn more about \"Colonoscopy: What to Expect at Home. \"   © 3227-2811 [employer]. Care instructions adapted under license by [facility] (which disclaims liability or warranty for this information). This care instruction is for use with your licensed healthcare professional. If you have questions about a medical condition or this instruction, always ask your healthcare professional. [address] any warranty or liability for your use of this information.   Content Version: 74.0.221050; Current as of: November 14, 2014

## 2018-11-15 NOTE — ANESTHESIA PREPROCEDURE EVALUATION
Anesthetic History No history of anesthetic complications Review of Systems / Medical History Patient summary reviewed and pertinent labs reviewed Pulmonary COPD: moderate Neuro/Psych Within defined limits Cardiovascular Hypertension: well controlled Exercise tolerance: >4 METS 
  
GI/Hepatic/Renal 
Within defined limits Endo/Other Cancer (H/O Colon cancer) Other Findings Physical Exam 
 
Airway Mallampati: II 
TM Distance: 4 - 6 cm Neck ROM: normal range of motion Cardiovascular Regular rate and rhythm,  S1 and S2 normal,  no murmur, click, rub, or gallop Dental 
 
Dentition: Full lower dentures and Full upper dentures Pulmonary Breath sounds clear to auscultation Abdominal 
GI exam deferred Other Findings Anesthetic Plan ASA: 3 Anesthesia type: MAC Induction: Intravenous Anesthetic plan and risks discussed with: Patient

## 2018-11-15 NOTE — ANESTHESIA POSTPROCEDURE EVALUATION
Procedure(s): 
COLONOSCOPY. Anesthesia Post Evaluation Multimodal analgesia: multimodal analgesia used between 6 hours prior to anesthesia start to PACU discharge Patient location during evaluation: PACU Patient participation: complete - patient participated Level of consciousness: awake Pain score: 0 Pain management: satisfactory to patient Airway patency: patent Anesthetic complications: no 
Cardiovascular status: acceptable Respiratory status: acceptable Hydration status: acceptable Post anesthesia nausea and vomiting:  none Visit Vitals /54 Pulse 77 Temp 36.6 °C (97.8 °F) Resp 19 Ht 4' 10\" (1.473 m) Wt 65.3 kg (144 lb) SpO2 94% Breastfeeding? No  
BMI 30.10 kg/m²

## 2020-01-31 ENCOUNTER — HOSPITAL ENCOUNTER (EMERGENCY)
Age: 80
Discharge: HOME | End: 2020-01-31
Attending: EMERGENCY MEDICINE
Payer: MEDICARE

## 2020-01-31 VITALS
SYSTOLIC BLOOD PRESSURE: 152 MMHG | HEIGHT: 59 IN | DIASTOLIC BLOOD PRESSURE: 91 MMHG | HEART RATE: 92 BPM | OXYGEN SATURATION: 93 % | WEIGHT: 150 LBS | TEMPERATURE: 98.5 F | RESPIRATION RATE: 20 BRPM | BODY MASS INDEX: 30.24 KG/M2

## 2020-01-31 DIAGNOSIS — H10.31 ACUTE CONJUNCTIVITIS OF RIGHT EYE, UNSPECIFIED ACUTE CONJUNCTIVITIS TYPE: Primary | ICD-10-CM

## 2020-01-31 PROCEDURE — 74011000250 HC RX REV CODE- 250: Performed by: PHYSICIAN ASSISTANT

## 2020-01-31 PROCEDURE — 99284 EMERGENCY DEPT VISIT MOD MDM: CPT

## 2020-01-31 RX ORDER — PROPARACAINE HYDROCHLORIDE 5 MG/ML
1 SOLUTION/ DROPS OPHTHALMIC
Status: COMPLETED | OUTPATIENT
Start: 2020-01-31 | End: 2020-01-31

## 2020-01-31 RX ORDER — ERYTHROMYCIN 5 MG/G
OINTMENT OPHTHALMIC
Qty: 1 TUBE | Refills: 0 | Status: SHIPPED | OUTPATIENT
Start: 2020-01-31 | End: 2020-02-07

## 2020-01-31 RX ADMIN — FLUORESCEIN SODIUM 1 STRIP: 1 STRIP OPHTHALMIC at 12:48

## 2020-01-31 RX ADMIN — PROPARACAINE HYDROCHLORIDE 1 DROP: 5 SOLUTION/ DROPS OPHTHALMIC at 12:48

## 2020-01-31 NOTE — DISCHARGE INSTRUCTIONS
Patient Education        Pinkeye: Care Instructions  Your Care Instructions    Pinkeye is redness and swelling of the eye surface and the conjunctiva (the lining of the eyelid and the covering of the white part of the eye). Pinkeye is also called conjunctivitis. Pinkeye is often caused by infection with bacteria or a virus. Dry air, allergies, smoke, and chemicals are other common causes. Pinkeye often clears on its own in 7 to 10 days. Antibiotics only help if the pinkeye is caused by bacteria. Pinkeye caused by infection spreads easily. If an allergy or chemical is causing pinkeye, it will not go away unless you can avoid whatever is causing it. Follow-up care is a key part of your treatment and safety. Be sure to make and go to all appointments, and call your doctor if you are having problems. It's also a good idea to know your test results and keep a list of the medicines you take. How can you care for yourself at home? · Wash your hands often. Always wash them before and after you treat pinkeye or touch your eyes or face. · Use moist cotton or a clean, wet cloth to remove crust. Wipe from the inside corner of the eye to the outside. Use a clean part of the cloth for each wipe. · Put cold or warm wet cloths on your eye a few times a day if the eye hurts. · Do not wear contact lenses or eye makeup until the pinkeye is gone. Throw away any eye makeup you were using when you got pinkeye. Clean your contacts and storage case. If you wear disposable contacts, use a new pair when your eye has cleared and it is safe to wear contacts again. · If the doctor gave you antibiotic ointment or eyedrops, use them as directed. Use the medicine for as long as instructed, even if your eye starts looking better soon. Keep the bottle tip clean, and do not let it touch the eye area. · To put in eyedrops or ointment:  ? Tilt your head back, and pull your lower eyelid down with one finger. ?  Drop or squirt the medicine inside the lower lid. ? Close your eye for 30 to 60 seconds to let the drops or ointment move around. ? Do not touch the ointment or dropper tip to your eyelashes or any other surface. · Do not share towels, pillows, or washcloths while you have pinkeye. When should you call for help? Call your doctor now or seek immediate medical care if:    · You have pain in your eye, not just irritation on the surface.     · You have a change in vision or loss of vision.     · You have an increase in discharge from the eye.     · Your eye has not started to improve or begins to get worse within 48 hours after you start using antibiotics.     · Pinkeye lasts longer than 7 days.    Watch closely for changes in your health, and be sure to contact your doctor if you have any problems. Where can you learn more? Go to http://kathy-nano.info/. Enter Y392 in the search box to learn more about \"Pinkeye: Care Instructions. \"  Current as of: June 26, 2019  Content Version: 12.2  © 5522-1211 [employer]. Care instructions adapted under license by [employer] (which disclaims liability or warranty for this information). If you have questions about a medical condition or this instruction, always ask your healthcare professional. [address] any warranty or liability for your use of this information. MyChart Activation    Thank you for requesting access to [employer]. Please follow the instructions below to securely access and download your online medical record. [employer] allows you to send messages to your doctor, view your test results, renew your prescriptions, schedule appointments, and more. How Do I Sign Up? 1. In your internet browser, go to [email or web address]  2. Click on the First Time User? Click Here link in the Sign In box. You will be redirect to the New Member Sign Up page. 3. Enter your [employer] Access Code exactly as it appears below.  You will not need to use this code after youve completed the sign-up process. If you do not sign up before the expiration date, you must request a new code. [employer] Access Code: [email or web address]  Expires: 3/16/2020 12:10 PM (This is the date your [employer] access code will )    4. Enter the last four digits of your Social Security Number (xxxx) and Date of Birth (mm/dd/yyyy) as indicated and click Submit. You will be taken to the next sign-up page. 5. Create a [employer] ID. This will be your [employer] login ID and cannot be changed, so think of one that is secure and easy to remember. 6. Create a [employer] password. You can change your password at any time. 7. Enter your Password Reset Question and Answer. This can be used at a later time if you forget your password. 8. Enter your e-mail address. You will receive e-mail notification when new information is available in [address]. 9. Click Sign Up. You can now view and download portions of your medical record. 10. Click the Download Summary menu link to download a portable copy of your medical information. Additional Information    If you have questions, please visit the Frequently Asked Questions section of the [employer] website at [email or web address] [email or web address]/. Remember, [employer] is NOT to be used for urgent needs. For medical emergencies, dial 911. Complete all medications as prescribed. Follow-up with ophthalmology in 1 week. Return to the ED immediately for any new or worsening symptoms.

## 2020-01-31 NOTE — ED TRIAGE NOTES
Patient c/o swelling and possible infection to right eye. Yellow drainage noted from right eye at time of triage. She states eye symptoms began on Sunday. C/o swelling to plantar aspect of right foot. Attributes foot pain to arthritis.

## 2020-01-31 NOTE — PROGRESS NOTES
conducted a Follow up consultation and Spiritual Assessment for Deirdre Henry, who is a 78 y.o.,female. The  provided the following Interventions:  Continued the relationship of care and support. Listened empathically. Chart reviewed. The following outcomes were achieved:  Patient expressed gratitude for 's visit. Assessment:  There are no further spiritual or Jehovah's witness issues which require Spiritual Care Services interventions at this time. Plan:  Chaplains will continue to follow and will provide pastoral care on an as needed/requested basis.  recommends bedside caregivers page  on duty if patient shows signs of acute spiritual or emotional distress.        [facility]   [phone]

## 2020-01-31 NOTE — ED PROVIDER NOTES
EMERGENCY DEPARTMENT HISTORY AND PHYSICAL EXAM    Date: 1/31/2020  Patient Name: Yannick Veliz    History of Presenting Illness     Chief Complaint   Patient presents with    Eye Pain    Foot Pain         History Provided By: patient     Chief Complaint: eye pain, redness, and discharge   Duration: 5 days   Timing:acute   Location: R eye  Quality: burning   Severity:moderate   Modifying Factors: cool compress has not helped  Associated Symptoms: chronic L foot pain secondary to arthritis       Additional History (Context): Pato Jasmine is a 78 y.o. female with PMH anemia, COPD, htn, and hyperlipidemia who presents with c/o R eye pain, redness, and discharge x 5 days. Denies any known injury to the eye, fever, chills, or rash. Pt denies wearing contact lenses. She reports photophobia and blurry vision. Pt also states she has had acute on chronic L foot pain x a few days but states \"I don't want to deal with that today. \" No other complaints reported. PCP: Nathan Vazquez MD    Current Outpatient Medications   Medication Sig Dispense Refill    erythromycin (ILOTYCIN) ophthalmic ointment Apply to the inner tear duct of the affected eye 6 times daily for 7 days. 1 Tube 0    celecoxib (CELEBREX) 200 mg capsule Take  by mouth daily.  loperamide (IMODIUM) 2 mg capsule Take  by mouth daily.  leflunomide (ARAVA) 20 mg tablet Take 20 mg by mouth daily.  cholecalciferol (VITAMIN D3) 1,000 unit cap Take  by mouth daily.  ascorbic acid, vitamin C, (VITAMIN C) 1,000 mg tablet Take 1,000 mg by mouth daily.  CARTIA  mg ER capsule 240 mg daily.  aspirin 81 mg chewable tablet Take 1 tablet by mouth daily. 30 tablet 0    acetaminophen (TYLENOL) 325 mg tablet Take 2 tablets by mouth every four (4) hours as needed. 30 tablet 0    pravastatin (PRAVACHOL) 40 mg tablet Take 80 mg by mouth daily.       budesonide-formoterol (SYMBICORT) 80-4.5 mcg/actuation HFAA inhaler Take 2 puffs by inhalation two (2) times a day.  albuterol (PROVENTIL VENTOLIN) 2.5 mg /3 mL (0.083 %) nebulizer solution Take  by inhalation every four (4) hours as needed. Past History     Past Medical History:  Past Medical History:   Diagnosis Date    Anemia     Bleeding     Cancer [facility])     Colon Cancer: Surgery 2013    COPD (chronic obstructive pulmonary disease) (HCC)     Essential (primary) hypertension     High cholesterol     Hyperlipidemia     Other ill-defined conditions(799.89)     internal bleeding per pt    SOB (shortness of breath)     Stool color black        Past Surgical History:  Past Surgical History:   Procedure Laterality Date    COLONOSCOPY N/A 11/15/2018    COLONOSCOPY performed by Ernestine Gallagher MD at [facility] ENDOSCOPY    ENDOSCOPY, COLON, DIAGNOSTIC      HX COLECTOMY      lap colon resection 13    HX HEENT      cataract rt and lt    HX ORTHOPAEDIC      rotator cuff repair to rt shoulder    HX OTHER SURGICAL      susan cataract sx    HX UROLOGICAL      bladder tack       Family History:  Family History   Problem Relation Age of Onset    Heart Disease Mother     Heart Attack Mother     Heart Disease Father     Diabetes Father     Cancer Brother     Heart Attack Brother        Social History:  Social History     Tobacco Use    Smoking status: Former Smoker     Last attempt to quit: 5/15/2008     Years since quittin.7    Smokeless tobacco: Never Used   Substance Use Topics    Alcohol use: No    Drug use: No       Allergies:  No Known Allergies      Review of Systems   Review of Systems   Constitutional: Negative. Negative for chills and fever. HENT: Negative. Negative for congestion, ear pain and rhinorrhea. Eyes: Positive for pain, discharge and redness. Respiratory: Negative. Negative for cough, shortness of breath, wheezing and stridor. Cardiovascular: Negative. Negative for chest pain and leg swelling. Gastrointestinal: Negative.   Negative for abdominal pain, constipation, diarrhea, nausea and vomiting. Genitourinary: Negative. Negative for dysuria and frequency. Musculoskeletal: Positive for arthralgias. Negative for back pain and neck pain. Chronic foot pain secondary to arthritis    Skin: Negative. Negative for rash and wound. Neurological: Negative. Negative for dizziness, seizures, syncope and headaches. All other systems reviewed and are negative. All Other Systems Negative  Physical Exam     Vitals:    01/31/20 1208   BP: (!) 152/91   Pulse: 92   Resp: 20   Temp: 98.5 °F (36.9 °C)   SpO2: 93%   Weight: 68 kg (150 lb)   Height: 4' 11\" (1.499 m)     Physical Exam  Vitals signs and nursing note reviewed. Constitutional:       General: She is in acute distress. Appearance: She is well-developed. She is not diaphoretic. Comments: Mildly distressed   HENT:      Head: Normocephalic and atraumatic. Eyes:      General: No scleral icterus. Right eye: Discharge present. Left eye: No discharge. Extraocular Movements: Extraocular movements intact. Pupils: Pupils are equal, round, and reactive to light. Comments: L eye WNL  R eye: conjunctiva erythematous with yellow purulent discharge noted. Markos Aw shows a large area of dye uptake at the 6 o'clock position, negative cheikh sign. No dendritic lesions noted. Slight periorbital erythema noted, however patient has been applying cool compress to the area upon walking in the room. EOM intact and painless. Neck:      Musculoskeletal: Normal range of motion and neck supple. Cardiovascular:      Rate and Rhythm: Normal rate and regular rhythm. Heart sounds: Normal heart sounds. No murmur. No friction rub. No gallop. Pulmonary:      Effort: Pulmonary effort is normal. No respiratory distress. Breath sounds: Normal breath sounds. No stridor. No wheezing, rhonchi or rales. Musculoskeletal: Normal range of motion.    Skin:     General: Skin is warm and dry. Findings: No erythema or rash. Neurological:      Mental Status: She is alert and oriented to person, place, and time. Coordination: Coordination normal.      Comments: Gait is steady and patient exhibits no evidence of ataxia. Patient is able to ambulate without difficulty. No focal neurological deficit noted. No facial droop, slurred speech, or evidence of altered mentation noted on exam.     Psychiatric:         Behavior: Behavior normal.         Thought Content: Thought content normal.                Diagnostic Study Results     Labs -   No results found for this or any previous visit (from the past 12 hour(s)). Radiologic Studies -   No orders to display     CT Results  (Last 48 hours)    None        CXR Results  (Last 48 hours)    None            Medical Decision Making   I am the first provider for this patient. I reviewed the vital signs, available nursing notes, past medical history, past surgical history, family history and social history. Vital Signs-Reviewed the patient's vital signs. Records Reviewed: Ilda Queen PA-C     Procedures:  Procedures    Provider Notes (Medical Decision Making): Impression:  Conjunctivitis    Clinical presentation suggestive of acute conjunctivitis. No rash or dendritic lesions to suggest herpes zoster. Pt is noted to have visual changes to the R eye. She states she has an opthalmologist that she can follow-up with this week. I have discussed the signs/sx of shingles with the pt and recommendation for very close follow-up this week. Pt agrees with this plan. Ilda Queen PA-C     MED RECONCILIATION:  No current facility-administered medications for this encounter. Current Outpatient Medications   Medication Sig    erythromycin (ILOTYCIN) ophthalmic ointment Apply to the inner tear duct of the affected eye 6 times daily for 7 days.  celecoxib (CELEBREX) 200 mg capsule Take  by mouth daily.     loperamide (IMODIUM) 2 mg capsule Take  by mouth daily.  leflunomide (ARAVA) 20 mg tablet Take 20 mg by mouth daily.  cholecalciferol (VITAMIN D3) 1,000 unit cap Take  by mouth daily.  ascorbic acid, vitamin C, (VITAMIN C) 1,000 mg tablet Take 1,000 mg by mouth daily.  CARTIA  mg ER capsule 240 mg daily.  aspirin 81 mg chewable tablet Take 1 tablet by mouth daily.  acetaminophen (TYLENOL) 325 mg tablet Take 2 tablets by mouth every four (4) hours as needed.  pravastatin (PRAVACHOL) 40 mg tablet Take 80 mg by mouth daily.  budesonide-formoterol (SYMBICORT) 80-4.5 mcg/actuation HFAA inhaler Take 2 puffs by inhalation two (2) times a day.  albuterol (PROVENTIL VENTOLIN) 2.5 mg /3 mL (0.083 %) nebulizer solution Take  by inhalation every four (4) hours as needed. Disposition:  D.c    DISCHARGE NOTE:   Patient is stable for discharge at this time. I have discussed all the findings from today's work up with the patient, including lab results and imaging. I have answered all questions. Rx for erythromycin eye ointment given. Rest and close follow-up with the PCP recommended this week. Return to the ED immediately for any new or worsening symptoms. Ilda Queen PA-C     Follow-up Information     Follow up With Specialties Details Why Contact Info    your ophthalmologist  Schedule an appointment as soon as possible for a visit in 3 days      [id number]   As needed [address] [address]  [phone] [address] [facility] EMERGENCY DEPT Emergency Medicine  As needed, If symptoms worsen [address] [facility]  [phone]          Discharge Medication List as of 1/31/2020  2:07 PM      START taking these medications    Details   erythromycin (ILOTYCIN) ophthalmic ointment Apply to the inner tear duct of the affected eye 6 times daily for 7 days. , Print, Disp-1 Tube, R-0         CONTINUE these medications which have NOT CHANGED    Details   celecoxib (CELEBREX) 200 mg capsule Take  by mouth daily. , Historical Med      loperamide (IMODIUM) 2 mg capsule Take  by mouth daily. , Historical Med      leflunomide (ARAVA) 20 mg tablet Take 20 mg by mouth daily. , Historical Med      cholecalciferol (VITAMIN D3) 1,000 unit cap Take  by mouth daily. , Historical Med      ascorbic acid, vitamin C, (VITAMIN C) 1,000 mg tablet Take 1,000 mg by mouth daily. , Historical Med      CARTIA  mg ER capsule 240 mg daily. , Historical Med      aspirin 81 mg chewable tablet Take 1 tablet by mouth daily. , Print, Disp-30 tablet, R-0      acetaminophen (TYLENOL) 325 mg tablet Take 2 tablets by mouth every four (4) hours as needed. , Print, Disp-30 tablet, R-0      pravastatin (PRAVACHOL) 40 mg tablet Take 80 mg by mouth daily. , Historical Med      budesonide-formoterol (SYMBICORT) 80-4.5 mcg/actuation HFAA inhaler Take 2 puffs by inhalation two (2) times a day., Historical Med      albuterol (PROVENTIL VENTOLIN) 2.5 mg /3 mL (0.083 %) nebulizer solution Take  by inhalation every four (4) hours as needed., Historical Med                 Diagnosis     Clinical Impression:   1.  Acute conjunctivitis of right eye, unspecified acute conjunctivitis type

## 2020-09-25 ENCOUNTER — HOSPITAL ENCOUNTER (EMERGENCY)
Age: 80
Discharge: HOME | End: 2020-09-26
Attending: EMERGENCY MEDICINE
Payer: MEDICARE

## 2020-09-25 VITALS
HEART RATE: 99 BPM | DIASTOLIC BLOOD PRESSURE: 95 MMHG | SYSTOLIC BLOOD PRESSURE: 146 MMHG | OXYGEN SATURATION: 95 % | TEMPERATURE: 97.2 F | RESPIRATION RATE: 18 BRPM

## 2020-09-25 DIAGNOSIS — N30.01 ACUTE CYSTITIS WITH HEMATURIA: ICD-10-CM

## 2020-09-25 DIAGNOSIS — N17.9 AKI (ACUTE KIDNEY INJURY) (HCC): Primary | ICD-10-CM

## 2020-09-25 LAB
ALBUMIN SERPL-MCNC: 2.5 G/DL (ref 3.4–5)
ALBUMIN/GLOB SERPL: 0.4 {RATIO} (ref 0.8–1.7)
ALP SERPL-CCNC: 117 U/L (ref 45–117)
ALT SERPL-CCNC: 14 U/L (ref 13–56)
AMORPH CRY URNS QL MICRO: ABNORMAL
ANION GAP SERPL CALC-SCNC: 10 MMOL/L (ref 3–18)
ANION GAP SERPL CALC-SCNC: 9 MMOL/L (ref 3–18)
APPEARANCE UR: ABNORMAL
AST SERPL-CCNC: 10 U/L (ref 10–38)
BACTERIA URNS QL MICRO: ABNORMAL /HPF
BASOPHILS # BLD: 0 K/UL (ref 0–0.1)
BASOPHILS NFR BLD: 0 % (ref 0–2)
BILIRUB SERPL-MCNC: 0.2 MG/DL (ref 0.2–1)
BILIRUB UR QL: NEGATIVE
BUN SERPL-MCNC: 51 MG/DL (ref 7–18)
BUN SERPL-MCNC: 58 MG/DL (ref 7–18)
BUN/CREAT SERPL: 20 (ref 12–20)
BUN/CREAT SERPL: 20 (ref 12–20)
CALCIUM SERPL-MCNC: 7.5 MG/DL (ref 8.5–10.1)
CALCIUM SERPL-MCNC: 9.3 MG/DL (ref 8.5–10.1)
CHLORIDE SERPL-SCNC: 113 MMOL/L (ref 100–111)
CHLORIDE SERPL-SCNC: 119 MMOL/L (ref 100–111)
CO2 SERPL-SCNC: 18 MMOL/L (ref 21–32)
CO2 SERPL-SCNC: 20 MMOL/L (ref 21–32)
COLOR UR: YELLOW
CREAT SERPL-MCNC: 2.51 MG/DL (ref 0.6–1.3)
CREAT SERPL-MCNC: 2.9 MG/DL (ref 0.6–1.3)
DIFFERENTIAL METHOD BLD: ABNORMAL
EOSINOPHIL # BLD: 0.1 K/UL (ref 0–0.4)
EOSINOPHIL NFR BLD: 1 % (ref 0–5)
EPITH CASTS URNS QL MICRO: ABNORMAL /LPF (ref 0–5)
ERYTHROCYTE [DISTWIDTH] IN BLOOD BY AUTOMATED COUNT: 18.6 % (ref 11.6–14.5)
GLOBULIN SER CALC-MCNC: 6.1 G/DL (ref 2–4)
GLUCOSE SERPL-MCNC: 130 MG/DL (ref 74–99)
GLUCOSE SERPL-MCNC: 75 MG/DL (ref 74–99)
GLUCOSE UR STRIP.AUTO-MCNC: NEGATIVE MG/DL
GRAN CASTS URNS QL MICRO: ABNORMAL /LPF
HCT VFR BLD AUTO: 34.4 % (ref 35–45)
HGB BLD-MCNC: 10.9 G/DL (ref 12–16)
HGB UR QL STRIP: ABNORMAL
HYALINE CASTS URNS QL MICRO: ABNORMAL /LPF (ref 0–2)
KETONES UR QL STRIP.AUTO: NEGATIVE MG/DL
LEUKOCYTE ESTERASE UR QL STRIP.AUTO: ABNORMAL
LYMPHOCYTES # BLD: 1.1 K/UL (ref 0.9–3.6)
LYMPHOCYTES NFR BLD: 10 % (ref 21–52)
MAGNESIUM SERPL-MCNC: 2.1 MG/DL (ref 1.6–2.6)
MCH RBC QN AUTO: 26.1 PG (ref 24–34)
MCHC RBC AUTO-ENTMCNC: 31.7 G/DL (ref 31–37)
MCV RBC AUTO: 82.5 FL (ref 74–97)
MONOCYTES # BLD: 0.3 K/UL (ref 0.05–1.2)
MONOCYTES NFR BLD: 3 % (ref 3–10)
MUCOUS THREADS URNS QL MICRO: ABNORMAL /LPF
NEUTS SEG # BLD: 9.9 K/UL (ref 1.8–8)
NEUTS SEG NFR BLD: 86 % (ref 40–73)
NITRITE UR QL STRIP.AUTO: NEGATIVE
PH UR STRIP: 5 [PH] (ref 5–8)
PLATELET # BLD AUTO: 459 K/UL (ref 135–420)
PMV BLD AUTO: 9.5 FL (ref 9.2–11.8)
POTASSIUM SERPL-SCNC: 3.7 MMOL/L (ref 3.5–5.5)
POTASSIUM SERPL-SCNC: 4.2 MMOL/L (ref 3.5–5.5)
PROT SERPL-MCNC: 8.6 G/DL (ref 6.4–8.2)
PROT UR STRIP-MCNC: 100 MG/DL
RBC # BLD AUTO: 4.17 M/UL (ref 4.2–5.3)
RBC #/AREA URNS HPF: ABNORMAL /HPF (ref 0–5)
SODIUM SERPL-SCNC: 143 MMOL/L (ref 136–145)
SODIUM SERPL-SCNC: 146 MMOL/L (ref 136–145)
SP GR UR REFRACTOMETRY: 1.02 (ref 1–1.03)
TROPONIN I SERPL-MCNC: <0.02 NG/ML (ref 0–0.04)
UROBILINOGEN UR QL STRIP.AUTO: 0.2 EU/DL (ref 0.2–1)
WBC # BLD AUTO: 11.4 K/UL (ref 4.6–13.2)
WBC URNS QL MICRO: ABNORMAL /HPF (ref 0–4)

## 2020-09-25 PROCEDURE — 85025 COMPLETE CBC W/AUTO DIFF WBC: CPT

## 2020-09-25 PROCEDURE — 81001 URINALYSIS AUTO W/SCOPE: CPT

## 2020-09-25 PROCEDURE — 93005 ELECTROCARDIOGRAM TRACING: CPT

## 2020-09-25 PROCEDURE — 84484 ASSAY OF TROPONIN QUANT: CPT

## 2020-09-25 PROCEDURE — 80053 COMPREHEN METABOLIC PANEL: CPT

## 2020-09-25 PROCEDURE — 99283 EMERGENCY DEPT VISIT LOW MDM: CPT

## 2020-09-25 PROCEDURE — 83735 ASSAY OF MAGNESIUM: CPT

## 2020-09-25 RX ORDER — ONDANSETRON 2 MG/ML
4 INJECTION INTRAMUSCULAR; INTRAVENOUS
Status: ACTIVE | OUTPATIENT
Start: 2020-09-25 | End: 2020-09-25

## 2020-09-25 RX ORDER — CEPHALEXIN 500 MG/1
500 CAPSULE ORAL 3 TIMES DAILY
Qty: 21 CAP | Refills: 0 | Status: SHIPPED | OUTPATIENT
Start: 2020-09-25 | End: 2020-10-02

## 2020-09-25 NOTE — ED PROVIDER NOTES
EMERGENCY DEPARTMENT HISTORY AND PHYSICAL EXAM    Date: 9/25/2020  Patient Name: Anson Andrews    History of Presenting Illness     Chief Complaint   Patient presents with    Abnormal Lab Results         History Provided By: Patient and daughter (medical power of )    Chief Complaint: Abnormal lab results, concern for UTI, fatigue  Duration: 2 weeks  Timing: Gradually worsening  Location: Diffuse body  Quality: Weak  Severity: Moderate  Modifying Factors: Worse after she received an infusion for osteoporosis 2 weeks ago  Associated Symptoms: none       Additional History (Context): Anson Andrews is a [de-identified] y.o. female with a history of osteoporosis, COPD and colon cancer who presents today for history as listed above. Patient was called by her rheumatologist after having some lab work done last week stating that there is concern for decreased kidney function and UTI. Patient reports she has not felt well since she received a infusion 2 weeks ago for osteoporosis. Reports that she has not felt like doing much including eating and drinking. Reports she lives at home with a stepson. Denies any recent illness, fevers, chills, nausea, vomiting, diarrhea, constipation, urinary symptoms, abdominal pain, chest pain or shortness of breath. Denies any recent cough. PCP: Lashanda Corea DO    Current Facility-Administered Medications   Medication Dose Route Frequency Provider Last Rate Last Dose    ondansetron (ZOFRAN) injection 4 mg  4 mg IntraVENous NOW Fabiola Sheets PA        sodium chloride 0.9 % bolus infusion 1,000 mL  1,000 mL IntraVENous ONCE Wiliam Sheets, [address]        sodium chloride 0.9 % bolus infusion 1,000 mL  1,000 mL IntraVENous ONCE Wiliam Sheets, [address]         Current Outpatient Medications   Medication Sig Dispense Refill    cephALEXin (Keflex) 500 mg capsule Take 1 Cap by mouth three (3) times daily for 7 days.  21 Cap 0    celecoxib (CELEBREX) 200 mg capsule Take  by mouth daily.      loperamide (IMODIUM) 2 mg capsule Take  by mouth daily.  leflunomide (ARAVA) 20 mg tablet Take 20 mg by mouth daily.  cholecalciferol (VITAMIN D3) 1,000 unit cap Take  by mouth daily.  ascorbic acid, vitamin C, (VITAMIN C) 1,000 mg tablet Take 1,000 mg by mouth daily.  CARTIA  mg ER capsule 240 mg daily.  aspirin 81 mg chewable tablet Take 1 tablet by mouth daily. 30 tablet 0    acetaminophen (TYLENOL) 325 mg tablet Take 2 tablets by mouth every four (4) hours as needed. 30 tablet 0    pravastatin (PRAVACHOL) 40 mg tablet Take 80 mg by mouth daily.  budesonide-formoterol (SYMBICORT) 80-4.5 mcg/actuation HFAA inhaler Take 2 puffs by inhalation two (2) times a day.  albuterol (PROVENTIL VENTOLIN) 2.5 mg /3 mL (0.083 %) nebulizer solution Take  by inhalation every four (4) hours as needed.          Past History     Past Medical History:  Past Medical History:   Diagnosis Date    Anemia     Bleeding     Cancer [facility])     Colon Cancer: Surgery 6/2013    COPD (chronic obstructive pulmonary disease) (HCC)     Essential (primary) hypertension     High cholesterol     Hyperlipidemia     Other ill-defined conditions(799.89)     internal bleeding per pt    SOB (shortness of breath)     Stool color black        Past Surgical History:  Past Surgical History:   Procedure Laterality Date    COLONOSCOPY N/A 11/15/2018    COLONOSCOPY performed by Ludwig Mehta MD at [facility] ENDOSCOPY    ENDOSCOPY, COLON, DIAGNOSTIC      HX COLECTOMY      lap colon resection 6/4/13    HX HEENT      cataract rt and lt    HX ORTHOPAEDIC      rotator cuff repair to rt shoulder    HX OTHER SURGICAL      susan cataract sx    HX UROLOGICAL      bladder tack       Family History:  Family History   Problem Relation Age of Onset    Heart Disease Mother     Heart Attack Mother     Heart Disease Father     Diabetes Father     Cancer Brother     Heart Attack Brother        Social History:  Social History     Tobacco Use    Smoking status: Former Smoker     Last attempt to quit: 5/15/2008     Years since quittin.3    Smokeless tobacco: Never Used   Substance Use Topics    Alcohol use: No    Drug use: No       Allergies:  No Known Allergies      Review of Systems   Review of Systems   Constitutional: Positive for fatigue. Negative for chills and fever. HENT: Negative for congestion, rhinorrhea and sore throat. Respiratory: Negative for cough and shortness of breath. Cardiovascular: Negative for chest pain. Gastrointestinal: Negative for abdominal pain, blood in stool, constipation, diarrhea, nausea and vomiting. Genitourinary: Negative for dysuria, frequency and hematuria. Musculoskeletal: Negative for back pain and myalgias. Skin: Negative for rash and wound. Neurological: Negative for dizziness and headaches. All other systems reviewed and are negative. All Other Systems Negative  Physical Exam     Vitals:    20 1105   BP: (!) 146/95   Pulse: 99   Resp: 18   Temp: 97.2 °F (36.2 °C)   SpO2: 95%     Physical Exam  Vitals signs and nursing note reviewed. Constitutional:       General: She is not in acute distress. Appearance: She is well-developed. She is not diaphoretic. HENT:      Head: Normocephalic and atraumatic. Eyes:      Conjunctiva/sclera: Conjunctivae normal.   Neck:      Musculoskeletal: Normal range of motion and neck supple. Cardiovascular:      Rate and Rhythm: Normal rate and regular rhythm. Pulses: No decreased pulses. Heart sounds: Normal heart sounds. No systolic murmur. No diastolic murmur. Pulmonary:      Effort: Pulmonary effort is normal. No respiratory distress. Breath sounds: Normal breath sounds. No stridor, decreased air movement or transmitted upper airway sounds. No decreased breath sounds, wheezing or rhonchi. Comments: No respiratory distress, speaking in full sentences.   Chest:      Chest wall: No tenderness. Abdominal:      General: Bowel sounds are normal. There is no distension. Palpations: Abdomen is soft. Tenderness: There is no abdominal tenderness. There is no guarding or rebound. Comments: Soft and nontender   Musculoskeletal:         General: No deformity. Right lower leg: No edema. Left lower leg: No edema. Skin:     General: Skin is warm and dry. Neurological:      Mental Status: She is alert and oriented to person, place, and time. Deep Tendon Reflexes: Reflexes are normal and symmetric.            Diagnostic Study Results     Labs -     Recent Results (from the past 12 hour(s))   URINALYSIS W/ RFLX MICROSCOPIC    Collection Time: 09/25/20 11:08 AM   Result Value Ref Range    Color YELLOW      Appearance CLOUDY      Specific gravity 1.019 1.005 - 1.030      pH (UA) 5.0 5.0 - 8.0      Protein 100 (A) NEG mg/dL    Glucose Negative NEG mg/dL    Ketone Negative NEG mg/dL    Bilirubin Negative NEG      Blood SMALL (A) NEG      Urobilinogen 0.2 0.2 - 1.0 EU/dL    Nitrites Negative NEG      Leukocyte Esterase SMALL (A) NEG     URINE MICROSCOPIC ONLY    Collection Time: 09/25/20 11:08 AM   Result Value Ref Range    WBC 1 to 6 0 - 4 /hpf    RBC 3 to 5 0 - 5 /hpf    Epithelial cells 2+ 0 - 5 /lpf    Bacteria 2+ (A) NEG /hpf    Mucus FEW (A) NEG /lpf    Amorphous Crystals FEW (A) NEG      Hyaline cast 0 to 1 0 - 2 /lpf    Granular cast 1 to 2 NEG /lpf   EKG, 12 LEAD, INITIAL    Collection Time: 09/25/20 12:40 PM   Result Value Ref Range    Ventricular Rate 89 BPM    Atrial Rate 89 BPM    P-R Interval 122 ms    QRS Duration 84 ms    Q-T Interval 354 ms    QTC Calculation (Bezet) 430 ms    Calculated P Axis 62 degrees    Calculated R Axis 30 degrees    Calculated T Axis 72 degrees    Diagnosis       Normal sinus rhythm  Normal ECG  When compared with ECG of 26-JUN-2018 13:50,  No significant change was found     CBC WITH AUTOMATED DIFF    Collection Time: 09/25/20 12:50 PM   Result Value Ref Range    WBC 11.4 4.6 - 13.2 K/uL    RBC 4.17 (L) 4.20 - 5.30 M/uL    HGB 10.9 (L) 12.0 - 16.0 g/dL    HCT 34.4 (L) 35.0 - 45.0 %    MCV 82.5 74.0 - 97.0 FL    MCH 26.1 24.0 - 34.0 PG    MCHC 31.7 31.0 - 37.0 g/dL    RDW 18.6 (H) 11.6 - 14.5 %    PLATELET 389 (H) 858 - 420 K/uL    MPV 9.5 9.2 - 11.8 FL    NEUTROPHILS 86 (H) 40 - 73 %    LYMPHOCYTES 10 (L) 21 - 52 %    MONOCYTES 3 3 - 10 %    EOSINOPHILS 1 0 - 5 %    BASOPHILS 0 0 - 2 %    ABS. NEUTROPHILS 9.9 (H) 1.8 - 8.0 K/UL    ABS. LYMPHOCYTES 1.1 0.9 - 3.6 K/UL    ABS. MONOCYTES 0.3 0.05 - 1.2 K/UL    ABS. EOSINOPHILS 0.1 0.0 - 0.4 K/UL    ABS. BASOPHILS 0.0 0.0 - 0.1 K/UL    DF AUTOMATED     METABOLIC PANEL, COMPREHENSIVE    Collection Time: 09/25/20 12:50 PM   Result Value Ref Range    Sodium 143 136 - 145 mmol/L    Potassium 3.7 3.5 - 5.5 mmol/L    Chloride 113 (H) 100 - 111 mmol/L    CO2 20 (L) 21 - 32 mmol/L    Anion gap 10 3.0 - 18 mmol/L    Glucose 130 (H) 74 - 99 mg/dL    BUN 58 (H) 7.0 - 18 MG/DL    Creatinine 2.90 (H) 0.6 - 1.3 MG/DL    BUN/Creatinine ratio 20 12 - 20      GFR est AA 19 (L) >60 ml/min/1.73m2    GFR est non-AA 16 (L) >60 ml/min/1.73m2    Calcium 9.3 8.5 - 10.1 MG/DL    Bilirubin, total 0.2 0.2 - 1.0 MG/DL    ALT (SGPT) 14 13 - 56 U/L    AST (SGOT) 10 10 - 38 U/L    Alk.  phosphatase 117 45 - 117 U/L    Protein, total 8.6 (H) 6.4 - 8.2 g/dL    Albumin 2.5 (L) 3.4 - 5.0 g/dL    Globulin 6.1 (H) 2.0 - 4.0 g/dL    A-G Ratio 0.4 (L) 0.8 - 1.7     TROPONIN I    Collection Time: 09/25/20 12:50 PM   Result Value Ref Range    Troponin-I, QT <0.02 0.0 - 0.045 NG/ML   MAGNESIUM    Collection Time: 09/25/20 12:50 PM   Result Value Ref Range    Magnesium 2.1 1.6 - 2.6 mg/dL   METABOLIC PANEL, BASIC    Collection Time: 09/25/20  5:30 PM   Result Value Ref Range    Sodium 146 (H) 136 - 145 mmol/L    Potassium 4.2 3.5 - 5.5 mmol/L    Chloride 119 (H) 100 - 111 mmol/L    CO2 18 (L) 21 - 32 mmol/L    Anion gap 9 3.0 - 18 mmol/L    Glucose 75 74 - 99 mg/dL    BUN 51 (H) 7.0 - 18 MG/DL    Creatinine 2.51 (H) 0.6 - 1.3 MG/DL    BUN/Creatinine ratio 20 12 - 20      GFR est AA 22 (L) >60 ml/min/1.73m2    GFR est non-AA 18 (L) >60 ml/min/1.73m2    Calcium 7.5 (L) 8.5 - 10.1 MG/DL       Radiologic Studies -   No orders to display     CT Results  (Last 48 hours)    None        CXR Results  (Last 48 hours)    None            Medical Decision Making   I am the first provider for this patient. I reviewed the vital signs, available nursing notes, past medical history, past surgical history, family history and social history. Vital Signs-Reviewed the patient's vital signs. Records Reviewed: Nursing Notes and Old Medical Records     Procedures: None   Procedures    Provider Notes (Medical Decision Making):     Differential: Dehydration, UTI, pyelonephritis, JULIAN, kidney failure, electrolyte abnormality    Plan: We will repeat labs, and UA.      1:51 PM  Have discussed creatinine and UA findings with patient and daughter. Patient and daughter both agree on trial of rehydration repeat BMP. Have discussed with Dr. Héctor Torres  who agrees with the plan. 3:46 PM  Patient is done with her first bag, will hang second bag. Will repeat BMP after. 5:20 PM  Patient has had her second liter of fluids, will repeat BMP. Reports she is feeling much better at this time. 6:15 PM  Have discussed repeat BMP with patient and Dr. Eloisa Byers. Offered patient and daughter admission however they would prefer to go home, follow-up on Monday with repeat labs and orally hydrate at home. Patient states understanding that if her condition should worsen she would need to come back to the emergency department immediately. Daughter also states understanding. Will discharge home.       MED RECONCILIATION:  Current Facility-Administered Medications   Medication Dose Route Frequency    ondansetron (ZOFRAN) injection 4 mg  4 mg IntraVENous NOW    sodium chloride 0.9 % bolus infusion 1,000 mL  1,000 mL IntraVENous ONCE    sodium chloride 0.9 % bolus infusion 1,000 mL  1,000 mL IntraVENous ONCE     Current Outpatient Medications   Medication Sig    cephALEXin (Keflex) 500 mg capsule Take 1 Cap by mouth three (3) times daily for 7 days.  celecoxib (CELEBREX) 200 mg capsule Take  by mouth daily.  loperamide (IMODIUM) 2 mg capsule Take  by mouth daily.  leflunomide (ARAVA) 20 mg tablet Take 20 mg by mouth daily.  cholecalciferol (VITAMIN D3) 1,000 unit cap Take  by mouth daily.  ascorbic acid, vitamin C, (VITAMIN C) 1,000 mg tablet Take 1,000 mg by mouth daily.  CARTIA  mg ER capsule 240 mg daily.  aspirin 81 mg chewable tablet Take 1 tablet by mouth daily.  acetaminophen (TYLENOL) 325 mg tablet Take 2 tablets by mouth every four (4) hours as needed.  pravastatin (PRAVACHOL) 40 mg tablet Take 80 mg by mouth daily.  budesonide-formoterol (SYMBICORT) 80-4.5 mcg/actuation HFAA inhaler Take 2 puffs by inhalation two (2) times a day.  albuterol (PROVENTIL VENTOLIN) 2.5 mg /3 mL (0.083 %) nebulizer solution Take  by inhalation every four (4) hours as needed. Disposition:  Home     DISCHARGE NOTE:   Pt has been reexamined. Patient has no new complaints, changes, or physical findings. Care plan outlined and precautions discussed. Results of workup were reviewed with the patient. All medications were reviewed with the patient. All of pt's questions and concerns were addressed. Patient was instructed and agrees to follow up with PCP as well as to return to the ED upon further deterioration. Patient is ready to go home.     Follow-up Information     Follow up With Specialties Details Why Contact Info    [facility] EMERGENCY DEPT Emergency Medicine  As needed [address]  [phone]    Joslyn Dunn DO Family Medicine Schedule an appointment as soon as possible for a visit  [address] [facility] [address]   [phone]            Current Discharge Medication List      START taking these medications    Details   cephALEXin (Keflex) 500 mg capsule Take 1 Cap by mouth three (3) times daily for 7 days. Qty: 21 Cap, Refills: 0                 Diagnosis     Clinical Impression:   1. JULIAN (acute kidney injury) ([facility] [address]    2. Acute cystitis with hematuria          \"Please note that this dictation was completed with [employer], the computer voice recognition software. Quite often unanticipated grammatical, syntax, homophones, and other interpretive errors are inadvertently transcribed by the computer software. Please disregard these errors. Please excuse any errors that have escaped final proofreading. \"

## 2020-09-25 NOTE — DISCHARGE INSTRUCTIONS

## 2020-09-25 NOTE — ED NOTES
N/v x 3 days. Sent in by PMD due to decreased kidney function and possible UTI. I performed a brief evaluation, including history and physical, of the patient here in triage and I have determined that pt will need further treatment and evaluation from the main side ER physician. I have placed initial orders to help in expediting patients care.      September 25, 2020 at 11:11 AM - LUCIANA Mix        Visit Vitals  BP (!) 146/95 (BP 1 Location: Left arm, BP Patient Position: Sitting)   Pulse 99   Temp 97.2 °F (36.2 °C)   Resp 18   SpO2 95%        t

## 2020-09-26 LAB
ATRIAL RATE: 89 BPM
CALCULATED P AXIS, ECG09: 62 DEGREES
CALCULATED R AXIS, ECG10: 30 DEGREES
CALCULATED T AXIS, ECG11: 72 DEGREES
DIAGNOSIS, 93000: NORMAL
P-R INTERVAL, ECG05: 122 MS
Q-T INTERVAL, ECG07: 354 MS
QRS DURATION, ECG06: 84 MS
QTC CALCULATION (BEZET), ECG08: 430 MS
VENTRICULAR RATE, ECG03: 89 BPM

## 2020-11-04 ENCOUNTER — HOSPITAL ENCOUNTER (OUTPATIENT)
Dept: LAB | Age: 80
Discharge: HOME | End: 2020-11-04
Payer: MEDICARE

## 2020-11-04 PROCEDURE — 87635 SARS-COV-2 COVID-19 AMP PRB: CPT

## 2020-11-05 RX ORDER — LANOLIN ALCOHOL/MO/W.PET/CERES
CREAM (GRAM) TOPICAL
COMMUNITY

## 2020-11-06 LAB — SARS-COV-2, COV2NT: NOT DETECTED

## 2020-11-07 ENCOUNTER — ANESTHESIA EVENT (OUTPATIENT)
Dept: ENDOSCOPY | Age: 80
End: 2020-11-07
Payer: MEDICARE

## 2020-11-09 ENCOUNTER — HOSPITAL ENCOUNTER (OUTPATIENT)
Age: 80
Setting detail: OUTPATIENT SURGERY
Discharge: HOME | End: 2020-11-09
Attending: INTERNAL MEDICINE | Admitting: INTERNAL MEDICINE
Payer: MEDICARE

## 2020-11-09 ENCOUNTER — ANESTHESIA (OUTPATIENT)
Dept: ENDOSCOPY | Age: 80
End: 2020-11-09
Payer: MEDICARE

## 2020-11-09 VITALS
DIASTOLIC BLOOD PRESSURE: 81 MMHG | BODY MASS INDEX: 26.9 KG/M2 | HEART RATE: 69 BPM | WEIGHT: 137 LBS | TEMPERATURE: 97 F | OXYGEN SATURATION: 95 % | SYSTOLIC BLOOD PRESSURE: 154 MMHG | RESPIRATION RATE: 20 BRPM | HEIGHT: 60 IN

## 2020-11-09 PROCEDURE — 99100 ANES PT EXTEME AGE<1 YR&>70: CPT | Performed by: ANESTHESIOLOGY

## 2020-11-09 PROCEDURE — 74011250637 HC RX REV CODE- 250/637: Performed by: NURSE ANESTHETIST, CERTIFIED REGISTERED

## 2020-11-09 PROCEDURE — 00731 ANES UPR GI NDSC PX NOS: CPT | Performed by: ANESTHESIOLOGY

## 2020-11-09 PROCEDURE — 76040000019: Performed by: INTERNAL MEDICINE

## 2020-11-09 PROCEDURE — 74011250636 HC RX REV CODE- 250/636: Performed by: NURSE ANESTHETIST, CERTIFIED REGISTERED

## 2020-11-09 PROCEDURE — 00731 ANES UPR GI NDSC PX NOS: CPT | Performed by: NURSE ANESTHETIST, CERTIFIED REGISTERED

## 2020-11-09 PROCEDURE — 76060000031 HC ANESTHESIA FIRST 0.5 HR: Performed by: INTERNAL MEDICINE

## 2020-11-09 PROCEDURE — 77030019988 HC FCPS ENDOSC DISP BSC -B: Performed by: INTERNAL MEDICINE

## 2020-11-09 PROCEDURE — 2709999900 HC NON-CHARGEABLE SUPPLY: Performed by: INTERNAL MEDICINE

## 2020-11-09 PROCEDURE — 74011250636 HC RX REV CODE- 250/636: Performed by: ANESTHESIOLOGY

## 2020-11-09 PROCEDURE — 88305 TISSUE EXAM BY PATHOLOGIST: CPT

## 2020-11-09 PROCEDURE — 99100 ANES PT EXTEME AGE<1 YR&>70: CPT | Performed by: NURSE ANESTHETIST, CERTIFIED REGISTERED

## 2020-11-09 PROCEDURE — 88342 IMHCHEM/IMCYTCHM 1ST ANTB: CPT

## 2020-11-09 PROCEDURE — 77030008565 HC TBNG SUC IRR ERBE -B: Performed by: INTERNAL MEDICINE

## 2020-11-09 RX ORDER — ONDANSETRON 2 MG/ML
4 INJECTION INTRAMUSCULAR; INTRAVENOUS ONCE
Status: CANCELLED | OUTPATIENT
Start: 2020-11-09 | End: 2020-11-09

## 2020-11-09 RX ORDER — LIDOCAINE HYDROCHLORIDE 10 MG/ML
0.1 INJECTION, SOLUTION EPIDURAL; INFILTRATION; INTRACAUDAL; PERINEURAL AS NEEDED
Status: DISCONTINUED | OUTPATIENT
Start: 2020-11-09 | End: 2020-11-09 | Stop reason: HOSPADM

## 2020-11-09 RX ORDER — SODIUM CHLORIDE 0.9 % (FLUSH) 0.9 %
5-40 SYRINGE (ML) INJECTION EVERY 8 HOURS
Status: CANCELLED | OUTPATIENT
Start: 2020-11-09

## 2020-11-09 RX ORDER — FAMOTIDINE 20 MG/1
20 TABLET, FILM COATED ORAL ONCE
Status: COMPLETED | OUTPATIENT
Start: 2020-11-09 | End: 2020-11-09

## 2020-11-09 RX ORDER — SODIUM CHLORIDE 0.9 % (FLUSH) 0.9 %
5-40 SYRINGE (ML) INJECTION AS NEEDED
Status: CANCELLED | OUTPATIENT
Start: 2020-11-09

## 2020-11-09 RX ORDER — SODIUM CHLORIDE 0.9 % (FLUSH) 0.9 %
5-40 SYRINGE (ML) INJECTION EVERY 8 HOURS
Status: DISCONTINUED | OUTPATIENT
Start: 2020-11-09 | End: 2020-11-09 | Stop reason: HOSPADM

## 2020-11-09 RX ORDER — MAGNESIUM SULFATE 100 %
4 CRYSTALS MISCELLANEOUS AS NEEDED
Status: CANCELLED | OUTPATIENT
Start: 2020-11-09

## 2020-11-09 RX ORDER — SODIUM CHLORIDE, SODIUM LACTATE, POTASSIUM CHLORIDE, CALCIUM CHLORIDE 600; 310; 30; 20 MG/100ML; MG/100ML; MG/100ML; MG/100ML
75 INJECTION, SOLUTION INTRAVENOUS CONTINUOUS
Status: DISCONTINUED | OUTPATIENT
Start: 2020-11-09 | End: 2020-11-09 | Stop reason: HOSPADM

## 2020-11-09 RX ORDER — PROPOFOL 10 MG/ML
INJECTION, EMULSION INTRAVENOUS AS NEEDED
Status: DISCONTINUED | OUTPATIENT
Start: 2020-11-09 | End: 2020-11-09 | Stop reason: HOSPADM

## 2020-11-09 RX ORDER — SODIUM CHLORIDE 0.9 % (FLUSH) 0.9 %
5-40 SYRINGE (ML) INJECTION AS NEEDED
Status: DISCONTINUED | OUTPATIENT
Start: 2020-11-09 | End: 2020-11-09 | Stop reason: HOSPADM

## 2020-11-09 RX ORDER — DEXTROSE 50 % IN WATER (D50W) INTRAVENOUS SYRINGE
25-50 AS NEEDED
Status: CANCELLED | OUTPATIENT
Start: 2020-11-09

## 2020-11-09 RX ADMIN — PROPOFOL 10 MG: 10 INJECTION, EMULSION INTRAVENOUS at 12:03

## 2020-11-09 RX ADMIN — PROPOFOL 30 MG: 10 INJECTION, EMULSION INTRAVENOUS at 12:02

## 2020-11-09 RX ADMIN — PROPOFOL 30 MG: 10 INJECTION, EMULSION INTRAVENOUS at 12:01

## 2020-11-09 RX ADMIN — PROPOFOL 10 MG: 10 INJECTION, EMULSION INTRAVENOUS at 12:04

## 2020-11-09 RX ADMIN — FAMOTIDINE 20 MG: 20 TABLET, FILM COATED ORAL at 11:32

## 2020-11-09 RX ADMIN — SODIUM CHLORIDE, SODIUM LACTATE, POTASSIUM CHLORIDE, AND CALCIUM CHLORIDE 75 ML/HR: 600; 310; 30; 20 INJECTION, SOLUTION INTRAVENOUS at 11:25

## 2020-11-09 NOTE — DISCHARGE INSTRUCTIONS
DISCHARGE SUMMARY from Nurse  PATIENT INSTRUCTIONS:  After general anesthesia or intravenous sedation, for 24 hours or while taking prescription Narcotics:  · Limit your activities  · Do not drive and operate hazardous machinery  · Do not make important personal or business decisions  · Do  not drink alcoholic beverages  · If you have not urinated within 8 hours after discharge, please contact your surgeon on call. Report the following to your surgeon:  · Excessive pain, swelling, redness or odor of or around the surgical area  · Temperature over 100.5  · Nausea and vomiting lasting longer than 4 hours or if unable to take medications  · Any signs of decreased circulation or nerve impairment to extremity: change in color, persistent  numbness, tingling, coldness or increase pain  · Any questions    What to do at Home:  Recommended activity: Activity as tolerated and no driving for today. *  Please give a list of your current medications to your Primary Care Provider. *  Please update this list whenever your medications are discontinued, doses are      changed, or new medications (including over-the-counter products) are added. *  Please carry medication information at all times in case of emergency situations. These are general instructions for a healthy lifestyle:    No smoking/ No tobacco products/ Avoid exposure to second hand smoke  Surgeon General's Warning:  Quitting smoking now greatly reduces serious risk to your health. Obesity, smoking, and sedentary lifestyle greatly increases your risk for illness    A healthy diet, regular physical exercise & weight monitoring are important for maintaining a healthy lifestyle    You may be retaining fluid if you have a history of heart failure or if you experience any of the following symptoms:  Weight gain of 3 pounds or more overnight or 5 pounds in a week, increased swelling in our hands or feet or shortness of breath while lying flat in bed.   Please call your doctor as soon as you notice any of these symptoms; do not wait until your next office visit. The discharge information has been reviewed with the patient. The patient verbalized understanding. Discharge medications reviewed with the patient and appropriate educational materials and side effects teaching were provided. ___________________________________________________________________________________________________________________________________    Patient Education   Upper GI Endoscopy: What to Expect at [address] [place] [address] had an upper GI endoscopy. Your doctor used a thin, lighted tube that bends to look at the inside of your esophagus, your stomach, and the first part of the small intestine, called the duodenum. After you have an endoscopy, you will stay at the hospital or clinic for 1 to 2 hours. This will allow the medicine to wear off. You will be able to go home after your doctor or nurse checks to make sure that you're not having any problems. You may have to stay overnight if you had treatment during the test. You may have a sore throat for a day or two after the test.  This care sheet gives you a general idea about what to expect after the test.  How can you care for yourself at home? Activity   · Rest as much as you need to after you go home. · You should be able to go back to your usual activities the day after the test.  Diet   · Follow your doctor's directions for eating after the test.  · Drink plenty of fluids (unless your doctor has told you not to). Medications   · If you have a sore throat the day after the test, use an over-the-counter spray to numb your throat. Follow-up care is a key part of your treatment and safety. Be sure to make and go to all appointments, and call your doctor if you are having problems. It's also a good idea to know your test results and keep a list of the medicines you take. When should you call for help?    Call [phone] anytime you think you may need emergency care. For example, call if:    · You passed out (lost consciousness).     · You have trouble breathing.     · You pass maroon or bloody stools. Call your doctor now or seek immediate medical care if:    · You have pain that does not get better after your take pain medicine.     · You have new or worse belly pain.     · You have blood in your stools.     · You are sick to your stomach and cannot keep fluids down.     · You have a fever.     · You cannot pass stools or gas. Watch closely for changes in your health, and be sure to contact your doctor if:    · Your throat still hurts after a day or two.     · You do not get better as expected. Where can you learn more? Go to http://www.hughes.com/  Enter J454 in the search box to learn more about \"Upper GI Endoscopy: What to Expect at Home. \"  Current as of: April 15, 2020               Content Version: 12.6  © 8439-1930 [employer]. Care instructions adapted under license by [employer] (which disclaims liability or warranty for this information). If you have questions about a medical condition or this instruction, always ask your healthcare professional. [place] [address] any warranty or liability for your use of this information. Patient Education   Esophageal Dilation: What to Expect at Home  Your Recovery  After you have esophageal dilation, you will stay at the hospital or surgery center for 1 to 2 hours. This will allow the medicine to wear off. You will be able to go home after your doctor or nurse checks to make sure you are not having any problems. This care sheet gives you a general idea about how long it will take for you to recover. But each person recovers at a different pace. Follow the steps below to get better as quickly as possible. How can you care for yourself at home?   Activity    · Rest as much as you need to after you go home.     · You should be able to go back to your usual activities the day after the procedure. Diet    · Follow your doctor's directions for eating after the procedure.     · Drink plenty of fluids (unless your doctor has told you not to). Medicines    · Your doctor will tell you if and when you can restart your medicines. He or she will also give you instructions about taking any new medicines.     · If you take aspirin or some other blood thinner, ask your doctor if and when to start taking it again. Make sure that you understand exactly what your doctor wants you to do.     · If you have a sore throat the day after the procedure, use an over-the-counter spray to numb your throat. Sucking on throat lozenges and gargling with warm salt water may also help relieve your symptoms. Follow-up care is a key part of your treatment and safety. Be sure to make and go to all appointments, and call your doctor if you are having problems. It's also a good idea to know your test results and keep a list of the medicines you take. When should you call for help? Call 911 anytime you think you may need emergency care. For example, call if:    · You passed out (lost consciousness).     · You have trouble breathing.     · Your stools are maroon or very bloody   Call your doctor now or seek immediate medical care if:    · You have new or worse belly pain.     · You have a fever.     · You have new or more blood in your stools.     · You are sick to your stomach and cannot drink fluids.     · You cannot pass stools or gas.     · You have pain that does not get better after you take pain medicine. Watch closely for changes in your health, and be sure to contact your doctor if:    · Your throat still hurts after a day or two.     · You do not get better as expected. Where can you learn more? Go to http://www.gray.com/  Enter J014 in the search box to learn more about \"Esophageal Dilation: What to Expect at Home. \"  Current as of: April 15, 2020               Content Version: 12.6  © 2006-2020 [employer]. Care instructions adapted under license by [employer] (which disclaims liability or warranty for this information). If you have questions about a medical condition or this instruction, always ask your healthcare professional. [address] any warranty or liability for your use of this information. Patient Education   Peptic Ulcer Disease: Care Instructions  Your Care Instructions     Peptic ulcers are sores on the inside of the stomach or the small intestine (such as a duodenal ulcer). They are most often caused by an infection with bacteria or from use of nonsteroidal anti-inflammatory drugs (NSAIDs). NSAIDs include aspirin, ibuprofen (Advil), and naproxen (Aleve). Your doctor may have prescribed medicine to reduce stomach acid. You also may need to take antibiotics if your peptic ulcers are caused by an infection. You can help yourself heal and keep ulcers from coming back by making some changes in your lifestyle. Quit smoking. Limit alcohol. Follow-up care is a key part of your treatment and safety. Be sure to make and go to all appointments, and call your doctor if you are having problems. It's also a good idea to know your test results and keep a list of the medicines you take. How can you care for yourself at home? · Be safe with medicines. Take your medicines exactly as prescribed. Call your doctor if you think you are having a problem with your medicine. · Do not take aspirin or other NSAIDs such as ibuprofen (Advil or Motrin) or naproxen (Aleve). Ask your doctor what you can take for pain. · Do not smoke. Smoking can make ulcers worse. If you need help quitting, talk to your doctor about stop-smoking programs and medicines. These can increase your chances of quitting for good. · Drink in moderation or avoid drinking alcohol.   · Eat a balanced diet of small, frequent meals. See a dietitian if you need help planning your meals. When should you call for help? Call 911 anytime you think you may need emergency care. For example, call if:    · You vomit blood or what looks like coffee grounds.     · You pass maroon or very bloody stools. Call your doctor now or seek immediate medical care if:    · You have new or worse belly pain.     · Your stools are black and look like tar, or they have streaks of blood.     · You vomit. Watch closely for changes in your health, and be sure to contact your doctor if:    · You do not get better as expected. Where can you learn more? Go to http://www.gray.com/  Enter E788 in the search box to learn more about \"Peptic Ulcer Disease: Care Instructions. \"  Current as of: April 15, 2020               Content Version: 12.6  © 9508-9267 [employer]. Care instructions adapted under license by [employer] (which disclaims liability or warranty for this information). If you have questions about a medical condition or this instruction, always ask your healthcare professional. [address] any warranty or liability for your use of this information.

## 2020-11-09 NOTE — ANESTHESIA POSTPROCEDURE EVALUATION
Procedure(s):  UPPER ENDOSCOPY w bx's w dilation. MAC    Anesthesia Post Evaluation      Multimodal analgesia: multimodal analgesia used between 6 hours prior to anesthesia start to PACU discharge  Patient location during evaluation: bedside  Patient participation: complete - patient participated  Level of consciousness: awake and alert  Pain management: adequate  Airway patency: patent  Anesthetic complications: no  Cardiovascular status: acceptable  Respiratory status: acceptable  Hydration status: acceptable  Post anesthesia nausea and vomiting:  none      INITIAL Post-op Vital signs:   Vitals Value Taken Time   /70 11/9/2020 12:23 PM   Temp 36.7 °C (98.1 °F) 11/9/2020 12:13 PM   Pulse 68 11/9/2020 12:31 PM   Resp 29 11/9/2020 12:31 PM   SpO2 99 % 11/9/2020 12:21 PM   Vitals shown include unvalidated device data.

## 2020-11-09 NOTE — ANESTHESIA PREPROCEDURE EVALUATION
Relevant Problems   No relevant active problems       Anesthetic History   No history of anesthetic complications            Review of Systems / Medical History  Patient summary reviewed and pertinent labs reviewed    Pulmonary    COPD: mild               Neuro/Psych   Within defined limits           Cardiovascular    Hypertension: well controlled          Hyperlipidemia         GI/Hepatic/Renal         Renal disease: stones       Endo/Other        Arthritis and cancer     Other Findings              Physical Exam    Airway    TM Distance: 4 - 6 cm  Neck ROM: normal range of motion   Mouth opening: Normal     Cardiovascular    Rhythm: regular  Rate: normal         Dental    Dentition: Edentulous     Pulmonary                Comments: Non labored Abdominal  GI exam deferred       Other Findings            Anesthetic Plan    ASA: 3  Anesthesia type: MAC            Anesthetic plan and risks discussed with: Patient

## 2021-02-13 ENCOUNTER — HOSPITAL ENCOUNTER (EMERGENCY)
Age: 81
Discharge: HOME | End: 2021-02-13
Attending: EMERGENCY MEDICINE
Payer: MEDICARE

## 2021-02-13 ENCOUNTER — APPOINTMENT (OUTPATIENT)
Dept: GENERAL RADIOLOGY | Age: 81
End: 2021-02-13
Attending: PHYSICIAN ASSISTANT
Payer: MEDICARE

## 2021-02-13 ENCOUNTER — APPOINTMENT (OUTPATIENT)
Dept: CT IMAGING | Age: 81
End: 2021-02-13
Attending: PHYSICIAN ASSISTANT
Payer: MEDICARE

## 2021-02-13 VITALS
TEMPERATURE: 98 F | OXYGEN SATURATION: 97 % | SYSTOLIC BLOOD PRESSURE: 124 MMHG | DIASTOLIC BLOOD PRESSURE: 71 MMHG | RESPIRATION RATE: 20 BRPM | HEART RATE: 76 BPM

## 2021-02-13 DIAGNOSIS — R07.9 CHEST PAIN, UNSPECIFIED TYPE: Primary | ICD-10-CM

## 2021-02-13 LAB
ALBUMIN SERPL-MCNC: 3.4 G/DL (ref 3.4–5)
ALBUMIN/GLOB SERPL: 0.8 {RATIO} (ref 0.8–1.7)
ALP SERPL-CCNC: 77 U/L (ref 45–117)
ALT SERPL-CCNC: 13 U/L (ref 13–56)
ANION GAP SERPL CALC-SCNC: 4 MMOL/L (ref 3–18)
AST SERPL-CCNC: 8 U/L (ref 10–38)
ATRIAL RATE: 76 BPM
BASOPHILS # BLD: 0 K/UL (ref 0–0.1)
BASOPHILS NFR BLD: 0 % (ref 0–2)
BILIRUB SERPL-MCNC: 0.3 MG/DL (ref 0.2–1)
BNP SERPL-MCNC: 534 PG/ML (ref 0–1800)
BUN SERPL-MCNC: 34 MG/DL (ref 7–18)
BUN/CREAT SERPL: 25 (ref 12–20)
CALCIUM SERPL-MCNC: 9.4 MG/DL (ref 8.5–10.1)
CALCULATED P AXIS, ECG09: 35 DEGREES
CALCULATED R AXIS, ECG10: 44 DEGREES
CALCULATED T AXIS, ECG11: 68 DEGREES
CHLORIDE SERPL-SCNC: 110 MMOL/L (ref 100–111)
CK MB CFR SERPL CALC: ABNORMAL % (ref 0–4)
CK MB CFR SERPL CALC: ABNORMAL % (ref 0–4)
CK MB SERPL-MCNC: <1 NG/ML (ref 5–25)
CK MB SERPL-MCNC: <1 NG/ML (ref 5–25)
CK SERPL-CCNC: 20 U/L (ref 26–192)
CK SERPL-CCNC: 21 U/L (ref 26–192)
CO2 SERPL-SCNC: 26 MMOL/L (ref 21–32)
CREAT SERPL-MCNC: 1.38 MG/DL (ref 0.6–1.3)
DIAGNOSIS, 93000: NORMAL
DIFFERENTIAL METHOD BLD: ABNORMAL
EOSINOPHIL # BLD: 0 K/UL (ref 0–0.4)
EOSINOPHIL NFR BLD: 0 % (ref 0–5)
ERYTHROCYTE [DISTWIDTH] IN BLOOD BY AUTOMATED COUNT: 14.1 % (ref 11.6–14.5)
GLOBULIN SER CALC-MCNC: 4.4 G/DL (ref 2–4)
GLUCOSE SERPL-MCNC: 101 MG/DL (ref 74–99)
HCT VFR BLD AUTO: 33.9 % (ref 35–45)
HGB BLD-MCNC: 10.8 G/DL (ref 12–16)
LYMPHOCYTES # BLD: 1.2 K/UL (ref 0.9–3.6)
LYMPHOCYTES NFR BLD: 12 % (ref 21–52)
MCH RBC QN AUTO: 28.6 PG (ref 24–34)
MCHC RBC AUTO-ENTMCNC: 31.9 G/DL (ref 31–37)
MCV RBC AUTO: 89.7 FL (ref 74–97)
MONOCYTES # BLD: 0.7 K/UL (ref 0.05–1.2)
MONOCYTES NFR BLD: 7 % (ref 3–10)
NEUTS SEG # BLD: 7.9 K/UL (ref 1.8–8)
NEUTS SEG NFR BLD: 81 % (ref 40–73)
P-R INTERVAL, ECG05: 118 MS
PLATELET # BLD AUTO: 261 K/UL (ref 135–420)
PMV BLD AUTO: 9.3 FL (ref 9.2–11.8)
POTASSIUM SERPL-SCNC: 4.3 MMOL/L (ref 3.5–5.5)
PROT SERPL-MCNC: 7.8 G/DL (ref 6.4–8.2)
Q-T INTERVAL, ECG07: 386 MS
QRS DURATION, ECG06: 82 MS
QTC CALCULATION (BEZET), ECG08: 434 MS
RBC # BLD AUTO: 3.78 M/UL (ref 4.2–5.3)
SODIUM SERPL-SCNC: 140 MMOL/L (ref 136–145)
TROPONIN I SERPL-MCNC: <0.02 NG/ML (ref 0–0.04)
TROPONIN I SERPL-MCNC: <0.02 NG/ML (ref 0–0.04)
VENTRICULAR RATE, ECG03: 76 BPM
WBC # BLD AUTO: 9.8 K/UL (ref 4.6–13.2)

## 2021-02-13 PROCEDURE — 82550 ASSAY OF CK (CPK): CPT

## 2021-02-13 PROCEDURE — 85025 COMPLETE CBC W/AUTO DIFF WBC: CPT

## 2021-02-13 PROCEDURE — 96374 THER/PROPH/DIAG INJ IV PUSH: CPT

## 2021-02-13 PROCEDURE — 93005 ELECTROCARDIOGRAM TRACING: CPT

## 2021-02-13 PROCEDURE — 99282 EMERGENCY DEPT VISIT SF MDM: CPT

## 2021-02-13 PROCEDURE — 71275 CT ANGIOGRAPHY CHEST: CPT

## 2021-02-13 PROCEDURE — 74011250636 HC RX REV CODE- 250/636: Performed by: PHYSICIAN ASSISTANT

## 2021-02-13 PROCEDURE — 74011000636 HC RX REV CODE- 636: Performed by: EMERGENCY MEDICINE

## 2021-02-13 PROCEDURE — 80053 COMPREHEN METABOLIC PANEL: CPT

## 2021-02-13 PROCEDURE — 71045 X-RAY EXAM CHEST 1 VIEW: CPT

## 2021-02-13 PROCEDURE — 83880 ASSAY OF NATRIURETIC PEPTIDE: CPT

## 2021-02-13 RX ORDER — MORPHINE SULFATE 4 MG/ML
2 INJECTION, SOLUTION INTRAMUSCULAR; INTRAVENOUS
Status: COMPLETED | OUTPATIENT
Start: 2021-02-13 | End: 2021-02-13

## 2021-02-13 RX ORDER — LIDOCAINE 50 MG/G
PATCH TOPICAL
Qty: 30 EACH | Refills: 0 | Status: SHIPPED | OUTPATIENT
Start: 2021-02-13

## 2021-02-13 RX ADMIN — MORPHINE SULFATE 2 MG: 4 INJECTION, SOLUTION INTRAMUSCULAR; INTRAVENOUS at 14:57

## 2021-02-13 RX ADMIN — IOPAMIDOL 100 ML: 755 INJECTION, SOLUTION INTRAVENOUS at 15:44

## 2021-02-13 NOTE — ED PROVIDER NOTES
EMERGENCY DEPARTMENT HISTORY AND PHYSICAL EXAM      Date: 2/13/2021  Patient Name: Marquise Gutierrez    History of Presenting Illness     Chief Complaint   Patient presents with    Chest Pain       History Provided By: Patient    HPI: Marquise Gutierrez, 80 y.o. female PMHx significant for anemia, colon cancer, htn, hyperlipidemia, COPD presents ambulatory to the ED with cc of intermittent sharp left sided CP x 5 days. Denies change in pain with activity. Pt reports pain is worse when laying on left side and with inspiration. Pt also reports hemoptysis this am. Denies SOB, dizziness. Pt denies cardiac hx. Denies hx blood clot. Does not take blood thinners. Pt has not taken anything for sx. Denies previous similar sx. Denies lower leg swelling and pain. There are no other complaints, changes, or physical findings at this time. PCP: Yue Wu, DO    No current facility-administered medications on file prior to encounter. Current Outpatient Medications on File Prior to Encounter   Medication Sig Dispense Refill    ferrous sulfate (Iron) 325 mg (65 mg iron) tablet Take  by mouth Daily (before breakfast).  aspirin-acetaminophen-caffeine (EXCEDRIN ES) 250-250-65 mg per tablet Take 1 Tab by mouth every eight (8) hours as needed for Headache.  celecoxib (CELEBREX) 200 mg capsule Take  by mouth daily.  loperamide (IMODIUM) 2 mg capsule Take  by mouth daily.  leflunomide (ARAVA) 20 mg tablet Take 20 mg by mouth daily.  cholecalciferol (VITAMIN D3) 1,000 unit cap Take  by mouth daily.  ascorbic acid, vitamin C, (VITAMIN C) 1,000 mg tablet Take 1,000 mg by mouth daily.  CARTIA  mg ER capsule 240 mg daily.  aspirin 81 mg chewable tablet Take 1 tablet by mouth daily. 30 tablet 0    acetaminophen (TYLENOL) 325 mg tablet Take 2 tablets by mouth every four (4) hours as needed. 30 tablet 0    pravastatin (PRAVACHOL) 40 mg tablet Take 80 mg by mouth daily.       budesonide-formoterol (SYMBICORT) 80-4.5 mcg/actuation HFAA inhaler Take 2 puffs by inhalation two (2) times a day.  albuterol (PROVENTIL VENTOLIN) 2.5 mg /3 mL (0.083 %) nebulizer solution Take  by inhalation every four (4) hours as needed. Past History     Past Medical History:  Past Medical History:   Diagnosis Date    Anemia     Bleeding     Cancer [facility])     Colon Cancer: Surgery 2013    COPD (chronic obstructive pulmonary disease) (HCC)     Essential (primary) hypertension     High cholesterol     Hyperlipidemia     Other ill-defined conditions(799.89)     internal bleeding per pt    SOB (shortness of breath)     Stool color black        Past Surgical History:  Past Surgical History:   Procedure Laterality Date    COLONOSCOPY N/A 11/15/2018    COLONOSCOPY performed by Gay Meza MD at [facility] ENDOSCOPY    ENDOSCOPY, COLON, DIAGNOSTIC      HX COLECTOMY      lap colon resection 13    HX HEENT      cataract rt and lt    HX ORTHOPAEDIC      rotator cuff repair to rt shoulder    HX OTHER SURGICAL      susan cataract sx    HX UROLOGICAL      bladder tack       Family History:  Family History   Problem Relation Age of Onset    Heart Disease Mother     Heart Attack Mother     Heart Disease Father     Diabetes Father     Cancer Brother     Heart Attack Brother        Social History:  Social History     Tobacco Use    Smoking status: Former Smoker     Quit date: 5/15/2008     Years since quittin.7    Smokeless tobacco: Never Used   Substance Use Topics    Alcohol use: No    Drug use: No       Allergies: Allergies   Allergen Reactions    Amoxicillin Other (comments)     Causes severe vaginal burning         Review of Systems   Review of Systems   Constitutional: Negative for chills and fever. Respiratory: Negative for shortness of breath. Cardiovascular: Positive for chest pain. Gastrointestinal: Negative for abdominal pain, nausea and vomiting.    Genitourinary: Negative for flank pain. Musculoskeletal: Negative for back pain and myalgias. Skin: Negative for color change, pallor, rash and wound. Neurological: Negative for dizziness, weakness and light-headedness. All other systems reviewed and are negative. Physical Exam   Physical Exam  Vitals signs and nursing note reviewed. Constitutional:       General: She is not in acute distress. Appearance: She is well-developed. Comments: Pt well-appearing in NAD   HENT:      Head: Normocephalic and atraumatic. Eyes:      Conjunctiva/sclera: Conjunctivae normal.   Cardiovascular:      Rate and Rhythm: Normal rate and regular rhythm. Heart sounds: Normal heart sounds. Comments: No lower leg swelling b/l  Pulmonary:      Effort: Pulmonary effort is normal. No respiratory distress. Breath sounds: Normal breath sounds. Comments: Lungs CTA  Not working to breathe  Chest:       Abdominal:      General: Bowel sounds are normal. There is no distension. Palpations: Abdomen is soft. Musculoskeletal: Normal range of motion. Skin:     General: Skin is warm. Findings: No rash. Neurological:      Mental Status: She is alert and oriented to person, place, and time. Psychiatric:         Behavior: Behavior normal.         Diagnostic Study Results     Labs -     Recent Results (from the past 12 hour(s))   CBC WITH AUTOMATED DIFF    Collection Time: 02/13/21 10:56 AM   Result Value Ref Range    WBC 9.8 4.6 - 13.2 K/uL    RBC 3.78 (L) 4.20 - 5.30 M/uL    HGB 10.8 (L) 12.0 - 16.0 g/dL    HCT 33.9 (L) 35.0 - 45.0 %    MCV 89.7 74.0 - 97.0 FL    MCH 28.6 24.0 - 34.0 PG    MCHC 31.9 31.0 - 37.0 g/dL    RDW 14.1 11.6 - 14.5 %    PLATELET 948 554 - 691 K/uL    MPV 9.3 9.2 - 11.8 FL    NEUTROPHILS 81 (H) 40 - 73 %    LYMPHOCYTES 12 (L) 21 - 52 %    MONOCYTES 7 3 - 10 %    EOSINOPHILS 0 0 - 5 %    BASOPHILS 0 0 - 2 %    ABS. NEUTROPHILS 7.9 1.8 - 8.0 K/UL    ABS.  LYMPHOCYTES 1.2 0.9 - 3.6 K/UL ABS. MONOCYTES 0.7 0.05 - 1.2 K/UL    ABS. EOSINOPHILS 0.0 0.0 - 0.4 K/UL    ABS. BASOPHILS 0.0 0.0 - 0.1 K/UL    DF AUTOMATED     METABOLIC PANEL, COMPREHENSIVE    Collection Time: 02/13/21 10:56 AM   Result Value Ref Range    Sodium 140 136 - 145 mmol/L    Potassium 4.3 3.5 - 5.5 mmol/L    Chloride 110 100 - 111 mmol/L    CO2 26 21 - 32 mmol/L    Anion gap 4 3.0 - 18 mmol/L    Glucose 101 (H) 74 - 99 mg/dL    BUN 34 (H) 7.0 - 18 MG/DL    Creatinine 1.38 (H) 0.6 - 1.3 MG/DL    BUN/Creatinine ratio 25 (H) 12 - 20      GFR est AA 44 (L) >60 ml/min/1.73m2    GFR est non-AA 37 (L) >60 ml/min/1.73m2    Calcium 9.4 8.5 - 10.1 MG/DL    Bilirubin, total 0.3 0.2 - 1.0 MG/DL    ALT (SGPT) 13 13 - 56 U/L    AST (SGOT) 8 (L) 10 - 38 U/L    Alk.  phosphatase 77 45 - 117 U/L    Protein, total 7.8 6.4 - 8.2 g/dL    Albumin 3.4 3.4 - 5.0 g/dL    Globulin 4.4 (H) 2.0 - 4.0 g/dL    A-G Ratio 0.8 0.8 - 1.7     CARDIAC PANEL,(CK, CKMB & TROPONIN)    Collection Time: 02/13/21 10:56 AM   Result Value Ref Range    CK - MB <1.0 <3.6 ng/ml    CK-MB Index  0.0 - 4.0 %     CALCULATION NOT PERFORMED WHEN RESULT IS BELOW LINEAR LIMIT    CK 21 (L) 26 - 192 U/L    Troponin-I, QT <0.02 0.0 - 0.045 NG/ML   NT-PRO BNP    Collection Time: 02/13/21 10:56 AM   Result Value Ref Range    NT pro- 0 - 1,800 PG/ML   EKG, 12 LEAD, INITIAL    Collection Time: 02/13/21 11:00 AM   Result Value Ref Range    Ventricular Rate 76 BPM    Atrial Rate 76 BPM    P-R Interval 118 ms    QRS Duration 82 ms    Q-T Interval 386 ms    QTC Calculation (Bezet) 434 ms    Calculated P Axis 35 degrees    Calculated R Axis 44 degrees    Calculated T Axis 68 degrees    Diagnosis       Normal sinus rhythm  Normal ECG  When compared with ECG of 25-SEP-2020 12:40,  No significant change was found     CARDIAC PANEL,(CK, CKMB & TROPONIN)    Collection Time: 02/13/21  5:10 PM   Result Value Ref Range    CK - MB <1.0 <3.6 ng/ml    CK-MB Index  0.0 - 4.0 %     CALCULATION NOT PERFORMED WHEN RESULT IS BELOW LINEAR LIMIT    CK 20 (L) 26 - 192 U/L    Troponin-I, QT <0.02 0.0 - 0.045 NG/ML       Radiologic Studies -   CTA CHEST W OR W WO CONT   Final Result      No CT finding for acute pulmonary embolism or other acute process of the chest.      Small to moderate hiatal hernia. Atherosclerotic disease. XR CHEST PORT   Final Result   Minimal bibasilar atelectasis. CT Results  (Last 48 hours)               02/13/21 1554  CTA CHEST W OR W WO CONT Final result    Impression:      No CT finding for acute pulmonary embolism or other acute process of the chest.       Small to moderate hiatal hernia. Atherosclerotic disease. Narrative:  CT Chest Pulmonary Embolism Protocol        CPT CODE: 65126       INDICATION: Pleuritic chest pain. Question pulmonary embolism. TECHNIQUE: Thin collimation axial images obtained through the level of the   pulmonary arteries with additional imaging through the chest following the   uneventful administration of nonionic intravenous contrast.  Images   reconstructed into coronal and sagittal maximum intensity projections for better   evaluation of the tortuous and overlapping pulmonary vascular structures and to   reduce patient radiation dose. The patient received 100 cc of Isovue-370. All CT scans are performed using dose optimization techniques as appropriate to   the performed exam including the following: Automated exposure control,   adjustment of mA and/or kV according to patient size, and use of iterative   reconstructive technique. COMPARISON: 6/26/2018. FINDINGS:       This is a technically adequate study. No filling defects are appreciated within the main, left, right, lobar or   visualized segmental pulmonary arteries to suggest embolism. Atherosclerotic disease of the aorta without gross aneurysmal dilatation. No   overt dissection flap. Benedetta Ou Heart size is within normal limits.   No pericardial   effusion. No enlarged axillary or mediastinal lymphadenopathy. No hilar adenopathy. Nonspecific small mediastinal lymph nodes stable from 2018. Small-to-moderate   hiatal hernia       The lungs are without nodules or masses. No confluent airspace opacity. No   effusions. No pneumothorax. Diverticulosis in the partially imaged left colon. Degenerative changes of the spine. .               CXR Results  (Last 48 hours)               02/13/21 1358  XR CHEST PORT Final result    Impression:  Minimal bibasilar atelectasis. Narrative:  ONE VIEW CHEST RADIOGRAPH        INDICATION: chest pain. COMPARISON: Chest radiograph and CTA chest from 1/26/2018. TECHNIQUE: AP view of the chest       FINDINGS:       LINES/TUBES: None. MEDIASTINUM: Cardiac silhouette is within normal limits. LUNGS: Minimal bibasilar atelectasis. No pleural effusion or pneumothorax. BONES/OTHER: No acute osseous abnormality. Medical Decision Making   I am the first provider for this patient. I reviewed the vital signs, available nursing notes, past medical history, past surgical history, family history and social history. Vital Signs-Reviewed the patient's vital signs. Patient Vitals for the past 12 hrs:   Temp Pulse Resp BP SpO2   02/13/21 1054 98 °F (36.7 °C) 76 20 124/71 97 %         EKG interpretation: (Preliminary)  Rhythm: normal sinus rhythm; and regular . Rate (approx.): 76; Axis: normal; GA interval: normal; QRS interval: normal ; ST/T wave: normal; Other findings: normal.    No acute ischemic changes. Records Reviewed: Nursing Notes, Old Medical Records and Previous electrocardiograms    Provider Notes (Medical Decision Making):   DDx: ACS, CHF, PNA, PE, Musculoskeletal chest pain    79 yo F who presents with intermittent sharp left sided CP that is worse when laying on left side and with inspiration. Denies SOB. Denies cardiac hx.  EKG shows no acute ischemic changes with negative troponin x 2. CXR shows no acute changes. CTA negative for PE. Normal BNP. Suspect possible musculoskeletal cause due to pain with movement and palpation. Will treat with lidocaine patch. Discussed with patient need for prompt cardiology follow-up in 1 to 2 days. Patient nontoxic-appearing in NAD. Pt stable for prompt outpatient follow-up with PCP 1 to 2 days. Patient given strict instructions to return if symptoms worsen. ED Course:   Initial assessment performed. The patients presenting problems have been discussed, and they are in agreement with the care plan formulated and outlined with them. I have encouraged them to ask questions as they arise throughout their visit. Disposition:  5:53 PM  Discussed lab and imaging results with pt along with dx and treatment plan. Discussed importance of PCP follow up. All questions answered. Pt voiced they understood. Return if sx worsen. PLAN:  1. Current Discharge Medication List      START taking these medications    Details   lidocaine (Lidoderm) 5 % Apply patch to the affected area for 12 hours a day and remove for 12 hours a day. Qty: 30 Each, Refills: 0           2. Follow-up Information     Follow up With Specialties Details Why Contact Info    Marlon Zelaya,  Family Medicine Schedule an appointment as soon as possible for a visit today  [address] [place] [address] [address] [address] [phone]      Wale South MD Cardiology Schedule an appointment as soon as possible for a visit today  [address] [facility] EMERGENCY DEPT Emergency Medicine  As needed, If symptoms worsen [address]  [phone]        Return to ED if worse     Diagnosis     Clinical Impression:   1.  Chest pain, unspecified type        Attestations:    LUCIANA Hassan    Please note that this dictation was completed with cindy Martinez computer voice recognition software. Quite often unanticipated grammatical, syntax, homophones, and other interpretive errors are inadvertently transcribed by the computer software. Please disregard these errors. Please excuse any errors that have escaped final proofreading. Thank you.

## 2023-09-29 ENCOUNTER — APPOINTMENT (OUTPATIENT)
Facility: HOSPITAL | Age: 83
DRG: 189 | End: 2023-09-29
Payer: MEDICARE

## 2023-09-29 ENCOUNTER — HOSPITAL ENCOUNTER (INPATIENT)
Facility: HOSPITAL | Age: 83
LOS: 4 days | Discharge: HOME HEALTH | DRG: 189 | End: 2023-10-03
Attending: EMERGENCY MEDICINE | Admitting: FAMILY MEDICINE
Payer: MEDICARE

## 2023-09-29 DIAGNOSIS — R65.20 PUERPERAL SEPSIS WITH ACUTE HYPOXIC RESPIRATORY FAILURE WITHOUT SEPTIC SHOCK (HCC): Primary | ICD-10-CM

## 2023-09-29 DIAGNOSIS — J96.01 PUERPERAL SEPSIS WITH ACUTE HYPOXIC RESPIRATORY FAILURE WITHOUT SEPTIC SHOCK (HCC): Primary | ICD-10-CM

## 2023-09-29 PROBLEM — R09.02 HYPOXIA: Status: ACTIVE | Noted: 2023-09-29

## 2023-09-29 PROBLEM — R09.02 OXYGEN DESATURATION: Status: ACTIVE | Noted: 2023-09-29

## 2023-09-29 LAB
ALBUMIN SERPL-MCNC: 2.8 G/DL (ref 3.4–5)
ALBUMIN/GLOB SERPL: 0.6 (ref 0.8–1.7)
ALP SERPL-CCNC: 75 U/L (ref 45–117)
ALT SERPL-CCNC: 13 U/L (ref 13–56)
ANION GAP SERPL CALC-SCNC: 5 MMOL/L (ref 3–18)
AST SERPL-CCNC: 10 U/L (ref 10–38)
BASOPHILS # BLD: 0 K/UL (ref 0–0.1)
BASOPHILS NFR BLD: 0 % (ref 0–2)
BILIRUB SERPL-MCNC: 0.4 MG/DL (ref 0.2–1)
BUN SERPL-MCNC: 18 MG/DL (ref 7–18)
BUN/CREAT SERPL: 16 (ref 12–20)
CALCIUM SERPL-MCNC: 9.2 MG/DL (ref 8.5–10.1)
CHLORIDE SERPL-SCNC: 109 MMOL/L (ref 100–111)
CO2 SERPL-SCNC: 23 MMOL/L (ref 21–32)
CREAT SERPL-MCNC: 1.15 MG/DL (ref 0.6–1.3)
DIFFERENTIAL METHOD BLD: ABNORMAL
EKG ATRIAL RATE: 93 BPM
EKG DIAGNOSIS: NORMAL
EKG P AXIS: 53 DEGREES
EKG P-R INTERVAL: 120 MS
EKG Q-T INTERVAL: 366 MS
EKG QRS DURATION: 76 MS
EKG QTC CALCULATION (BAZETT): 455 MS
EKG R AXIS: 52 DEGREES
EKG T AXIS: 89 DEGREES
EKG VENTRICULAR RATE: 93 BPM
EOSINOPHIL # BLD: 0 K/UL (ref 0–0.4)
EOSINOPHIL NFR BLD: 0 % (ref 0–5)
ERYTHROCYTE [DISTWIDTH] IN BLOOD BY AUTOMATED COUNT: 13.7 % (ref 11.6–14.5)
FLUAV RNA SPEC QL NAA+PROBE: NOT DETECTED
FLUBV RNA SPEC QL NAA+PROBE: NOT DETECTED
GLOBULIN SER CALC-MCNC: 4.9 G/DL (ref 2–4)
GLUCOSE SERPL-MCNC: 104 MG/DL (ref 74–99)
HCT VFR BLD AUTO: 37.6 % (ref 35–45)
HGB BLD-MCNC: 11.8 G/DL (ref 12–16)
IMM GRANULOCYTES # BLD AUTO: 0.1 K/UL (ref 0–0.04)
IMM GRANULOCYTES NFR BLD AUTO: 1 % (ref 0–0.5)
LYMPHOCYTES # BLD: 1.4 K/UL (ref 0.9–3.6)
LYMPHOCYTES NFR BLD: 13 % (ref 21–52)
MCH RBC QN AUTO: 27.3 PG (ref 24–34)
MCHC RBC AUTO-ENTMCNC: 31.4 G/DL (ref 31–37)
MCV RBC AUTO: 87 FL (ref 78–100)
MONOCYTES # BLD: 0.5 K/UL (ref 0.05–1.2)
MONOCYTES NFR BLD: 5 % (ref 3–10)
NEUTS SEG # BLD: 8.3 K/UL (ref 1.8–8)
NEUTS SEG NFR BLD: 80 % (ref 40–73)
NRBC # BLD: 0 K/UL (ref 0–0.01)
NRBC BLD-RTO: 0 PER 100 WBC
PLATELET # BLD AUTO: 324 K/UL (ref 135–420)
PMV BLD AUTO: 9.5 FL (ref 9.2–11.8)
POTASSIUM SERPL-SCNC: 3.8 MMOL/L (ref 3.5–5.5)
PROT SERPL-MCNC: 7.7 G/DL (ref 6.4–8.2)
RBC # BLD AUTO: 4.32 M/UL (ref 4.2–5.3)
SARS-COV-2 RNA RESP QL NAA+PROBE: NOT DETECTED
SODIUM SERPL-SCNC: 137 MMOL/L (ref 136–145)
WBC # BLD AUTO: 10.4 K/UL (ref 4.6–13.2)

## 2023-09-29 PROCEDURE — 2700000000 HC OXYGEN THERAPY PER DAY

## 2023-09-29 PROCEDURE — 6370000000 HC RX 637 (ALT 250 FOR IP): Performed by: STUDENT IN AN ORGANIZED HEALTH CARE EDUCATION/TRAINING PROGRAM

## 2023-09-29 PROCEDURE — 93005 ELECTROCARDIOGRAM TRACING: CPT | Performed by: EMERGENCY MEDICINE

## 2023-09-29 PROCEDURE — 93010 ELECTROCARDIOGRAM REPORT: CPT | Performed by: INTERNAL MEDICINE

## 2023-09-29 PROCEDURE — 85025 COMPLETE CBC W/AUTO DIFF WBC: CPT

## 2023-09-29 PROCEDURE — 94640 AIRWAY INHALATION TREATMENT: CPT

## 2023-09-29 PROCEDURE — 71046 X-RAY EXAM CHEST 2 VIEWS: CPT

## 2023-09-29 PROCEDURE — 6360000002 HC RX W HCPCS

## 2023-09-29 PROCEDURE — 87636 SARSCOV2 & INF A&B AMP PRB: CPT

## 2023-09-29 PROCEDURE — 1100000003 HC PRIVATE W/ TELEMETRY

## 2023-09-29 PROCEDURE — 94761 N-INVAS EAR/PLS OXIMETRY MLT: CPT

## 2023-09-29 PROCEDURE — 6370000000 HC RX 637 (ALT 250 FOR IP)

## 2023-09-29 PROCEDURE — 6360000002 HC RX W HCPCS: Performed by: STUDENT IN AN ORGANIZED HEALTH CARE EDUCATION/TRAINING PROGRAM

## 2023-09-29 PROCEDURE — 71275 CT ANGIOGRAPHY CHEST: CPT

## 2023-09-29 PROCEDURE — 96374 THER/PROPH/DIAG INJ IV PUSH: CPT

## 2023-09-29 PROCEDURE — 80053 COMPREHEN METABOLIC PANEL: CPT

## 2023-09-29 PROCEDURE — 6360000004 HC RX CONTRAST MEDICATION: Performed by: STUDENT IN AN ORGANIZED HEALTH CARE EDUCATION/TRAINING PROGRAM

## 2023-09-29 PROCEDURE — 99285 EMERGENCY DEPT VISIT HI MDM: CPT

## 2023-09-29 PROCEDURE — 2580000003 HC RX 258: Performed by: STUDENT IN AN ORGANIZED HEALTH CARE EDUCATION/TRAINING PROGRAM

## 2023-09-29 RX ORDER — ONDANSETRON 2 MG/ML
4 INJECTION INTRAMUSCULAR; INTRAVENOUS EVERY 6 HOURS PRN
Status: DISCONTINUED | OUTPATIENT
Start: 2023-09-29 | End: 2023-10-03 | Stop reason: HOSPADM

## 2023-09-29 RX ORDER — SODIUM CHLORIDE 0.9 % (FLUSH) 0.9 %
5-40 SYRINGE (ML) INJECTION EVERY 12 HOURS SCHEDULED
Status: DISCONTINUED | OUTPATIENT
Start: 2023-09-29 | End: 2023-09-29

## 2023-09-29 RX ORDER — SODIUM CHLORIDE 0.9 % (FLUSH) 0.9 %
5-40 SYRINGE (ML) INJECTION EVERY 12 HOURS SCHEDULED
Status: CANCELLED | OUTPATIENT
Start: 2023-09-29

## 2023-09-29 RX ORDER — SODIUM CHLORIDE 0.9 % (FLUSH) 0.9 %
5-40 SYRINGE (ML) INJECTION PRN
Status: DISCONTINUED | OUTPATIENT
Start: 2023-09-29 | End: 2023-10-03 | Stop reason: HOSPADM

## 2023-09-29 RX ORDER — POLYETHYLENE GLYCOL 3350 17 G/17G
17 POWDER, FOR SOLUTION ORAL DAILY PRN
Status: CANCELLED | OUTPATIENT
Start: 2023-09-29

## 2023-09-29 RX ORDER — ENOXAPARIN SODIUM 100 MG/ML
40 INJECTION SUBCUTANEOUS DAILY
Status: CANCELLED | OUTPATIENT
Start: 2023-09-29

## 2023-09-29 RX ORDER — DILTIAZEM HYDROCHLORIDE 240 MG/1
240 CAPSULE, COATED, EXTENDED RELEASE ORAL NIGHTLY
Status: DISCONTINUED | OUTPATIENT
Start: 2023-09-30 | End: 2023-10-03 | Stop reason: HOSPADM

## 2023-09-29 RX ORDER — LOPERAMIDE HYDROCHLORIDE 2 MG/1
2 CAPSULE ORAL DAILY
Status: DISCONTINUED | OUTPATIENT
Start: 2023-09-30 | End: 2023-10-03 | Stop reason: HOSPADM

## 2023-09-29 RX ORDER — ONDANSETRON 4 MG/1
4 TABLET, ORALLY DISINTEGRATING ORAL EVERY 8 HOURS PRN
Status: DISCONTINUED | OUTPATIENT
Start: 2023-09-29 | End: 2023-10-03 | Stop reason: HOSPADM

## 2023-09-29 RX ORDER — BUDESONIDE 0.5 MG/2ML
0.5 INHALANT ORAL
Status: DISCONTINUED | OUTPATIENT
Start: 2023-09-29 | End: 2023-10-03 | Stop reason: HOSPADM

## 2023-09-29 RX ORDER — ONDANSETRON 2 MG/ML
4 INJECTION INTRAMUSCULAR; INTRAVENOUS EVERY 6 HOURS PRN
Status: CANCELLED | OUTPATIENT
Start: 2023-09-29

## 2023-09-29 RX ORDER — PRAVASTATIN SODIUM 20 MG
80 TABLET ORAL DAILY
Status: DISCONTINUED | OUTPATIENT
Start: 2023-09-30 | End: 2023-09-29

## 2023-09-29 RX ORDER — SODIUM CHLORIDE 9 MG/ML
INJECTION, SOLUTION INTRAVENOUS PRN
Status: DISCONTINUED | OUTPATIENT
Start: 2023-09-29 | End: 2023-10-03 | Stop reason: HOSPADM

## 2023-09-29 RX ORDER — MAGNESIUM SULFATE IN WATER 40 MG/ML
2000 INJECTION, SOLUTION INTRAVENOUS PRN
Status: DISCONTINUED | OUTPATIENT
Start: 2023-09-29 | End: 2023-10-03 | Stop reason: HOSPADM

## 2023-09-29 RX ORDER — ACETAMINOPHEN 650 MG/1
650 SUPPOSITORY RECTAL EVERY 6 HOURS PRN
Status: DISCONTINUED | OUTPATIENT
Start: 2023-09-29 | End: 2023-10-03 | Stop reason: HOSPADM

## 2023-09-29 RX ORDER — DILTIAZEM HYDROCHLORIDE 240 MG/1
240 CAPSULE, COATED, EXTENDED RELEASE ORAL DAILY
Status: DISCONTINUED | OUTPATIENT
Start: 2023-09-30 | End: 2023-09-29

## 2023-09-29 RX ORDER — PRAVASTATIN SODIUM 20 MG
80 TABLET ORAL NIGHTLY
Status: DISCONTINUED | OUTPATIENT
Start: 2023-09-30 | End: 2023-10-03 | Stop reason: HOSPADM

## 2023-09-29 RX ORDER — ACETAMINOPHEN 650 MG/1
650 SUPPOSITORY RECTAL EVERY 6 HOURS PRN
Status: CANCELLED | OUTPATIENT
Start: 2023-09-29

## 2023-09-29 RX ORDER — ALBUTEROL SULFATE 2.5 MG/3ML
2.5 SOLUTION RESPIRATORY (INHALATION) EVERY 4 HOURS PRN
Status: DISCONTINUED | OUTPATIENT
Start: 2023-09-29 | End: 2023-10-02 | Stop reason: SDUPTHER

## 2023-09-29 RX ORDER — POLYETHYLENE GLYCOL 3350 17 G/17G
17 POWDER, FOR SOLUTION ORAL DAILY PRN
Status: DISCONTINUED | OUTPATIENT
Start: 2023-09-29 | End: 2023-10-03 | Stop reason: HOSPADM

## 2023-09-29 RX ORDER — POTASSIUM CHLORIDE 7.45 MG/ML
10 INJECTION INTRAVENOUS PRN
Status: DISCONTINUED | OUTPATIENT
Start: 2023-09-29 | End: 2023-10-03 | Stop reason: HOSPADM

## 2023-09-29 RX ORDER — IPRATROPIUM BROMIDE AND ALBUTEROL SULFATE 2.5; .5 MG/3ML; MG/3ML
1 SOLUTION RESPIRATORY (INHALATION) ONCE
Status: COMPLETED | OUTPATIENT
Start: 2023-09-29 | End: 2023-09-29

## 2023-09-29 RX ORDER — SODIUM CHLORIDE 0.9 % (FLUSH) 0.9 %
5-40 SYRINGE (ML) INJECTION PRN
Status: CANCELLED | OUTPATIENT
Start: 2023-09-29

## 2023-09-29 RX ORDER — BUDESONIDE AND FORMOTEROL FUMARATE DIHYDRATE 80; 4.5 UG/1; UG/1
2 AEROSOL RESPIRATORY (INHALATION) 2 TIMES DAILY
Status: DISCONTINUED | OUTPATIENT
Start: 2023-09-29 | End: 2023-09-29

## 2023-09-29 RX ORDER — ACETAMINOPHEN 325 MG/1
650 TABLET ORAL EVERY 6 HOURS PRN
Status: DISCONTINUED | OUTPATIENT
Start: 2023-09-29 | End: 2023-10-03 | Stop reason: HOSPADM

## 2023-09-29 RX ORDER — POTASSIUM CHLORIDE 20 MEQ/1
40 TABLET, EXTENDED RELEASE ORAL PRN
Status: DISCONTINUED | OUTPATIENT
Start: 2023-09-29 | End: 2023-10-03 | Stop reason: HOSPADM

## 2023-09-29 RX ORDER — POTASSIUM CHLORIDE 7.45 MG/ML
10 INJECTION INTRAVENOUS PRN
Status: CANCELLED | OUTPATIENT
Start: 2023-09-29

## 2023-09-29 RX ORDER — DEXTROMETHORPHAN POLISTIREX 30 MG/5ML
30 SUSPENSION ORAL EVERY 12 HOURS SCHEDULED
Status: DISCONTINUED | OUTPATIENT
Start: 2023-09-29 | End: 2023-10-03 | Stop reason: HOSPADM

## 2023-09-29 RX ORDER — ONDANSETRON 4 MG/1
4 TABLET, ORALLY DISINTEGRATING ORAL EVERY 8 HOURS PRN
Status: CANCELLED | OUTPATIENT
Start: 2023-09-29

## 2023-09-29 RX ORDER — SODIUM CHLORIDE 9 MG/ML
INJECTION, SOLUTION INTRAVENOUS PRN
Status: CANCELLED | OUTPATIENT
Start: 2023-09-29

## 2023-09-29 RX ORDER — ACETAMINOPHEN 325 MG/1
650 TABLET ORAL EVERY 6 HOURS PRN
Status: CANCELLED | OUTPATIENT
Start: 2023-09-29

## 2023-09-29 RX ORDER — MAGNESIUM SULFATE IN WATER 40 MG/ML
2000 INJECTION, SOLUTION INTRAVENOUS PRN
Status: CANCELLED | OUTPATIENT
Start: 2023-09-29

## 2023-09-29 RX ORDER — VITAMIN B COMPLEX
1000 TABLET ORAL DAILY
Status: DISCONTINUED | OUTPATIENT
Start: 2023-09-30 | End: 2023-10-03 | Stop reason: HOSPADM

## 2023-09-29 RX ORDER — MULTIVIT WITH MINERALS/LUTEIN
1000 TABLET ORAL DAILY
Status: DISCONTINUED | OUTPATIENT
Start: 2023-09-30 | End: 2023-10-03 | Stop reason: HOSPADM

## 2023-09-29 RX ORDER — ARFORMOTEROL TARTRATE 15 UG/2ML
15 SOLUTION RESPIRATORY (INHALATION)
Status: DISCONTINUED | OUTPATIENT
Start: 2023-09-29 | End: 2023-10-03 | Stop reason: HOSPADM

## 2023-09-29 RX ORDER — ALBUTEROL SULFATE 2.5 MG/3ML
2.5 SOLUTION RESPIRATORY (INHALATION) EVERY 6 HOURS PRN
Status: DISCONTINUED | OUTPATIENT
Start: 2023-09-29 | End: 2023-10-03 | Stop reason: HOSPADM

## 2023-09-29 RX ORDER — ASPIRIN 81 MG/1
81 TABLET, CHEWABLE ORAL DAILY
Status: DISCONTINUED | OUTPATIENT
Start: 2023-09-29 | End: 2023-10-03 | Stop reason: HOSPADM

## 2023-09-29 RX ADMIN — IOPAMIDOL 58 ML: 755 INJECTION, SOLUTION INTRAVENOUS at 14:51

## 2023-09-29 RX ADMIN — IPRATROPIUM BROMIDE AND ALBUTEROL SULFATE 1 DOSE: 2.5; .5 SOLUTION RESPIRATORY (INHALATION) at 12:57

## 2023-09-29 RX ADMIN — DEXTROMETHORPHAN 30 MG: 30 SUSPENSION, EXTENDED RELEASE ORAL at 23:29

## 2023-09-29 RX ADMIN — IPRATROPIUM BROMIDE AND ALBUTEROL SULFATE 1 DOSE: .5; 3 SOLUTION RESPIRATORY (INHALATION) at 12:37

## 2023-09-29 RX ADMIN — BUDESONIDE 500 MCG: 0.5 INHALANT RESPIRATORY (INHALATION) at 21:35

## 2023-09-29 RX ADMIN — ARFORMOTEROL TARTRATE 15 MCG: 15 SOLUTION RESPIRATORY (INHALATION) at 21:35

## 2023-09-29 RX ADMIN — METHYLPREDNISOLONE SODIUM SUCCINATE 125 MG: 125 INJECTION INTRAMUSCULAR; INTRAVENOUS at 13:45

## 2023-09-29 ASSESSMENT — ENCOUNTER SYMPTOMS
VOMITING: 0
ABDOMINAL PAIN: 0
SHORTNESS OF BREATH: 1
CONSTIPATION: 0
TROUBLE SWALLOWING: 0
DIARRHEA: 0
NAUSEA: 0
COUGH: 1
CHEST TIGHTNESS: 0
EYE REDNESS: 0
COLOR CHANGE: 0
PHOTOPHOBIA: 0
EYE PAIN: 0
BACK PAIN: 0

## 2023-09-29 NOTE — ED NOTES
Report called to Ru Horvath, on [address]. Sue Noel accepted the patient. Transport being put in for patient.      Pablo Ruelas RN  09/29/23 2842

## 2023-09-29 NOTE — H&P
[facility]  Admission History and Physical      Patient:    Mary Kate Hill      80 y.o. female            MRN:       [id number]                                                                                    Admission Date:         9/29/2023  Code status:                full    Wali Galvan is a 80y.o. year old female admitted for Hypoxia [R09.02]. ASSESSMENT AND PLAN  Problem List Items Addressed This Visit    None  Visit Diagnoses       Puerperal sepsis with acute hypoxic respiratory failure without septic shock ([address])    -  Primary            Cute hypoxic respiratory failure without septic shock  -COVID and influenza nasopharyngeal swabs did not detect SARS or rapid influenza strains AMB sent-patient received 2 DuoNeb treatments in the ER and stated that afterwards she felt much better however when she was ambulated, she desatted down to 59% as per  [facility] ER resident.  -Chest x-ray 2 views that were done in the ED showed likely minimal by basilar atelectasis  -CTA of the chest with and without contrast showed no evidence of PE  -CBC was unremarkable-did not show white count  -CMP showed GFR of 47  -Patient was afebrile throughout admission however upon exertion, patient's oxygen saturations started to decrease per Dr. MEDINA Man [facility] into the 46s  -Patient was placed on 1 L nasal cannula where she is satting at 94%  -Per daughter patient has decreased p.o. intake over the course of the last 2 weeks when she started having her COVID symptoms and feels weak  -Due to this patient's age, recent COVID stent, frail AT, we felt that this patient would benefit most from hospital admission to increase p.o. intake, optimize her oxygen status upon exertion, and explore options for placement if PT OT recommended.  -We feel that after 24 hours patient does not improve, we then may get the pulmonary team on board.     Plan:  -Admit to [facility]  -Daily CBC, daily BMP  -Continuous pulse ox  -Strict I's and Allergies   Allergen Reactions    Amoxicillin Other (See Comments)     Causes severe vaginal burning       No current facility-administered medications for this encounter. Current Outpatient Medications   Medication Sig Dispense Refill    acetaminophen (TYLENOL) 325 MG tablet Take 650 mg by mouth every 4 hours as needed      albuterol (PROVENTIL) (2.5 MG/3ML) 0.083% nebulizer solution Inhale into the lungs every 4 hours as needed      ascorbic acid (VITAMIN C) 1000 MG tablet Take 1,000 mg by mouth daily      aspirin 81 MG chewable tablet Take 81 mg by mouth daily      aspirin-acetaminophen-caffeine (EXCEDRIN MIGRAINE) 250-250-65 MG per tablet Take 1 tablet by mouth every 8 hours as needed      budesonide-formoterol (SYMBICORT) 80-4.5 MCG/ACT AERO Inhale 2 puffs into the lungs 2 times daily      celecoxib (CELEBREX) 200 MG capsule Take by mouth daily      vitamin D 25 MCG (1000 UT) CAPS Take by mouth daily      dilTIAZem (CARTIA XT) 240 MG extended release capsule 240 mg daily      ferrous sulfate (IRON 325) 325 (65 Fe) MG tablet Take by mouth every morning (before breakfast)      leflunomide (ARAVA) 20 MG tablet Take 20 mg by mouth daily      lidocaine (LIDODERM) 5 % Apply patch to the affected area for 12 hours a day and remove for 12 hours a day.       loperamide (IMODIUM) 2 MG capsule Take by mouth daily      pravastatin (PRAVACHOL) 40 MG tablet Take 80 mg by mouth daily          [unfilled]    (positive findings are in BOLD; negative findings are in regular font)  Constitutional: fevers, chills, appetite changes, weight changes, fatigue  HEENT: changes in vision, changes in hearing, sore throat, dysphagia  Cardiovascular: chest pain, palpitations, PND, orthopnea, edema  Pulmonary: SOB, cough, sputum production, wheezing, chest tightness  Gastrointestinal: abdominal pain, nausea/vomiting, diarrhea, constipation, melena, hematochezia  Genitourinary: dysuria, hesitation, dribbling, urgency,

## 2023-09-29 NOTE — ED NOTES
The following labs were labeled with appropriate pt sticker and tubed to lab:     [] Blue     [] Lavender   [] on ice  [] Green/yellow  [] Green/black [] on ice  [] Cinderella Camel  [] on ice  [] Yellow  [] Red  [] Pink  [] Type/ Screen  [] ABG  [] VBG    [x] COVID-19 swab    [x] Rapid  [] PCR  [x] Flu swab  [] Peds Viral Panel     [] Urine Sample  [] Fecal Sample  [] Pelvic Cultures  [] Blood Cultures  [] X 2  [] STREP Cultures       Kayli Sterling RN  09/29/23 0899

## 2023-09-29 NOTE — ED NOTES
The following labs were labeled with appropriate pt sticker and tubed to lab:     [x] Blue     [x] Lavender   [] on ice  [] Green/yellow  [x] Green/black [] on ice  [x] Grey  [x] on ice  [x] Yellow  [x] Red  [] Pink  [] Type/ Screen  [] ABG  [] VBG    [] COVID-19 swab    [] Rapid  [] PCR  [] Flu swab  [] Peds Viral Panel     [] Urine Sample  [] Fecal Sample  [] Pelvic Cultures  [] Blood Cultures  [] X 2  [] STREP Cultures       Sanjana Blackmon RN  09/29/23 1216

## 2023-09-29 NOTE — ED NOTES
Patient placed on O2 via nasal cannula @  3 lpm with a pulse ox noted of 96%.       Deon Lyons RN  09/29/23 1404

## 2023-09-29 NOTE — ED PROVIDER NOTES
EMERGENCY DEPARTMENT HISTORY AND PHYSICAL EXAM    8:12 PM      Date: 9/29/2023  Patient Name: Susan Lau    History of Presenting Illness     Chief Complaint   Patient presents with    Covid Testing     Pt has had covid for over 10 days. Pt continues to have severe cough and complaints of not feeling well. Pt placed on monitor and O2 noted at 85%         History Provided By: Patient and medical chart review. Additional History (Context): Wali Vail is a 80 y.o. female with history of colon cancer status postresection, hypertension, high cholesterol, hyperlipidemia, COPD presents to the emergency department with cough, decreased appetite in the setting of testing positive for COVID just over a week ago. Patient reports that on Wednesday last week she started having fevers, chills, cough, headaches and myalgias. She tested positive both on Wednesday and Friday last week. States that the fevers, chills headaches and myalgias have improved. But now she is having worse cough, decreased p.o. intake as when she coughs it causes her to develop nausea and vomiting afterwards. She describes shortness of breath but states that her shortness of breath is at its baseline, she does not have any increased dyspnea on exertion. She is not on any home oxygen.        PCP: Tanika Zamora,     Current Facility-Administered Medications   Medication Dose Route Frequency Provider Last Rate Last Admin    albuterol (PROVENTIL) (2.5 MG/3ML) 0.083% nebulizer solution 2.5 mg  2.5 mg Nebulization Q4H PRN Jessica Sarmiento MD        [START ON 9/30/2023] vitamin C tablet 1,000 mg  1,000 mg Oral Daily Jessica Sarmiento MD        [Held by provider] aspirin chewable tablet 81 mg  81 mg Oral Daily MD Francis Negron ON 9/30/2023] dilTIAZem (CARDIZEM CD) extended release capsule 240 mg  240 mg Oral Daily MD Francis Negron ON 9/30/2023] loperamide (IMODIUM) capsule 2 mg  2 mg Oral Daily Jessica Sarmiento MD [START ON 9/30/2023] pravastatin (PRAVACHOL) tablet 80 mg  80 mg Oral Daily Kaylene Bartlett MD        [START ON 9/30/2023] Vitamin D (CHOLECALCIFEROL) tablet 1,000 Units  1,000 Int'l Units Oral Daily Kaylene Bartlett MD        sodium chloride flush 0.9 % injection 5-40 mL  5-40 mL IntraVENous PRN Kaylene Bartlett MD        0.9 % sodium chloride infusion   IntraVENous PRN Kaylene Bartlett MD        ondansetron (ZOFRAN-ODT) disintegrating tablet 4 mg  4 mg Oral Q8H PRN Kaylene Bartlett MD        Or    ondansetron (ZOFRAN) injection 4 mg  4 mg IntraVENous Q6H PRN Kaylene Bartlett MD        polyethylene glycol (GLYCOLAX) packet 17 g  17 g Oral Daily PRN Kaylene Bartlett MD        acetaminophen (TYLENOL) tablet 650 mg  650 mg Oral Q6H PRN Kaylene Bartlett MD        Or    acetaminophen (TYLENOL) suppository 650 mg  650 mg Rectal Q6H PRN Kaylene Bartlett MD        potassium chloride (KLOR-CON M) extended release tablet 40 mEq  40 mEq Oral PRN Kaylene Bartlett MD        Or    potassium bicarb-citric acid (EFFER-K) effervescent tablet 40 mEq  40 mEq Oral PRN Kaylene Bartlett MD        Or    potassium chloride 10 mEq/100 mL IVPB (Peripheral Line)  10 mEq IntraVENous PRN Kaylene Bartlett MD        potassium chloride 10 mEq/100 mL IVPB (Peripheral Line)  10 mEq IntraVENous PRN Kaylene Bartlett MD        magnesium sulfate 2000 mg in 50 mL IVPB premix  2,000 mg IntraVENous PRN Kaylene Bartlett MD        albuterol (PROVENTIL) (2.5 MG/3ML) 0.083% nebulizer solution 2.5 mg  2.5 mg Nebulization Q6H PRN Kaylene Bartlett MD        arformoterol tartrate (BROVANA) nebulizer solution 15 mcg  15 mcg Nebulization BID RT Kaylene Bartlett MD        budesonide (PULMICORT) nebulizer suspension 500 mcg  0.5 mg Nebulization BID RT Kaylene Bartlett MD           Past History     Past Medical History:  Past Medical History:   Diagnosis Date    Anemia     Bleeding     Cancer [facility])     Colon Cancer: Surgery 6/2013    COPD (chronic obstructive pulmonary disease) ([address] [facility])

## 2023-09-30 ENCOUNTER — HOME HEALTH ADMISSION (OUTPATIENT)
Age: 83
End: 2023-09-30
Payer: MEDICARE

## 2023-09-30 LAB
ALBUMIN SERPL-MCNC: 2.6 G/DL (ref 3.4–5)
ALBUMIN/GLOB SERPL: 0.5 (ref 0.8–1.7)
ALP SERPL-CCNC: 71 U/L (ref 45–117)
ALT SERPL-CCNC: 12 U/L (ref 13–56)
ANION GAP SERPL CALC-SCNC: 9 MMOL/L (ref 3–18)
AST SERPL-CCNC: 7 U/L (ref 10–38)
BASOPHILS # BLD: 0 K/UL (ref 0–0.1)
BASOPHILS NFR BLD: 0 % (ref 0–2)
BILIRUB SERPL-MCNC: 0.4 MG/DL (ref 0.2–1)
BUN SERPL-MCNC: 18 MG/DL (ref 7–18)
BUN/CREAT SERPL: 17 (ref 12–20)
CALCIUM SERPL-MCNC: 8.7 MG/DL (ref 8.5–10.1)
CHLORIDE SERPL-SCNC: 105 MMOL/L (ref 100–111)
CO2 SERPL-SCNC: 20 MMOL/L (ref 21–32)
CREAT SERPL-MCNC: 1.09 MG/DL (ref 0.6–1.3)
DIFFERENTIAL METHOD BLD: ABNORMAL
EOSINOPHIL # BLD: 0 K/UL (ref 0–0.4)
EOSINOPHIL NFR BLD: 0 % (ref 0–5)
ERYTHROCYTE [DISTWIDTH] IN BLOOD BY AUTOMATED COUNT: 13.9 % (ref 11.6–14.5)
GLOBULIN SER CALC-MCNC: 4.9 G/DL (ref 2–4)
GLUCOSE SERPL-MCNC: 153 MG/DL (ref 74–99)
HCT VFR BLD AUTO: 36.2 % (ref 35–45)
HGB BLD-MCNC: 11.4 G/DL (ref 12–16)
IMM GRANULOCYTES # BLD AUTO: 0 K/UL (ref 0–0.04)
IMM GRANULOCYTES NFR BLD AUTO: 0 % (ref 0–0.5)
LYMPHOCYTES # BLD: 0.4 K/UL (ref 0.9–3.6)
LYMPHOCYTES NFR BLD: 2 % (ref 21–52)
MCH RBC QN AUTO: 27.5 PG (ref 24–34)
MCHC RBC AUTO-ENTMCNC: 31.5 G/DL (ref 31–37)
MCV RBC AUTO: 87.2 FL (ref 78–100)
MONOCYTES # BLD: 0.2 K/UL (ref 0.05–1.2)
MONOCYTES NFR BLD: 1 % (ref 3–10)
NEUTS BAND NFR BLD MANUAL: 4 %
NEUTS SEG # BLD: 21.8 K/UL (ref 1.8–8)
NEUTS SEG NFR BLD: 93 % (ref 40–73)
NRBC # BLD: 0 K/UL (ref 0–0.01)
NRBC BLD-RTO: 0 PER 100 WBC
PLATELET # BLD AUTO: 303 K/UL (ref 135–420)
PLATELET COMMENT: ABNORMAL
PMV BLD AUTO: 9.3 FL (ref 9.2–11.8)
POTASSIUM SERPL-SCNC: 3.8 MMOL/L (ref 3.5–5.5)
PROT SERPL-MCNC: 7.5 G/DL (ref 6.4–8.2)
RBC # BLD AUTO: 4.15 M/UL (ref 4.2–5.3)
RBC MORPH BLD: ABNORMAL
SODIUM SERPL-SCNC: 134 MMOL/L (ref 136–145)
WBC # BLD AUTO: 22.4 K/UL (ref 4.6–13.2)

## 2023-09-30 PROCEDURE — 6370000000 HC RX 637 (ALT 250 FOR IP)

## 2023-09-30 PROCEDURE — 6360000002 HC RX W HCPCS

## 2023-09-30 PROCEDURE — 2700000000 HC OXYGEN THERAPY PER DAY

## 2023-09-30 PROCEDURE — 80053 COMPREHEN METABOLIC PANEL: CPT

## 2023-09-30 PROCEDURE — 85025 COMPLETE CBC W/AUTO DIFF WBC: CPT

## 2023-09-30 PROCEDURE — 97165 OT EVAL LOW COMPLEX 30 MIN: CPT

## 2023-09-30 PROCEDURE — 1100000003 HC PRIVATE W/ TELEMETRY

## 2023-09-30 PROCEDURE — 94640 AIRWAY INHALATION TREATMENT: CPT

## 2023-09-30 PROCEDURE — 36415 COLL VENOUS BLD VENIPUNCTURE: CPT

## 2023-09-30 RX ORDER — PREDNISONE 20 MG/1
40 TABLET ORAL DAILY
Status: DISCONTINUED | OUTPATIENT
Start: 2023-09-30 | End: 2023-10-03 | Stop reason: HOSPADM

## 2023-09-30 RX ADMIN — Medication 1000 MG: at 10:47

## 2023-09-30 RX ADMIN — DILTIAZEM HYDROCHLORIDE 240 MG: 240 CAPSULE, EXTENDED RELEASE ORAL at 00:22

## 2023-09-30 RX ADMIN — DEXTROMETHORPHAN 30 MG: 30 SUSPENSION, EXTENDED RELEASE ORAL at 10:45

## 2023-09-30 RX ADMIN — PREDNISONE 40 MG: 20 TABLET ORAL at 14:50

## 2023-09-30 RX ADMIN — Medication 1000 UNITS: at 10:47

## 2023-09-30 RX ADMIN — DILTIAZEM HYDROCHLORIDE 240 MG: 240 CAPSULE, EXTENDED RELEASE ORAL at 22:15

## 2023-09-30 RX ADMIN — BUDESONIDE 500 MCG: 0.5 INHALANT RESPIRATORY (INHALATION) at 20:01

## 2023-09-30 RX ADMIN — DEXTROMETHORPHAN 30 MG: 30 SUSPENSION, EXTENDED RELEASE ORAL at 22:15

## 2023-09-30 RX ADMIN — PRAVASTATIN SODIUM 80 MG: 20 TABLET ORAL at 00:22

## 2023-09-30 RX ADMIN — ARFORMOTEROL TARTRATE 15 MCG: 15 SOLUTION RESPIRATORY (INHALATION) at 20:01

## 2023-09-30 RX ADMIN — PRAVASTATIN SODIUM 80 MG: 20 TABLET ORAL at 22:15

## 2023-09-30 RX ADMIN — LOPERAMIDE HYDROCHLORIDE 2 MG: 2 CAPSULE ORAL at 10:46

## 2023-09-30 NOTE — PROGRESS NOTES
[facility]  DAILY PROGRESS NOTE  THIS IS A MEDICAL STUDENT NOTE TO BE USED FOR EDUCATIONAL PURPOSES ONLY         Patient:    Margot Babcock , 80 y.o. female   MRN:  [id number]  Room/Bed:  419/01  Admission Date:   9/29/2023  Code status:  Full Code    Reason for Admission: Shortness of Breath following COVID-19    ASSESSMENT AND PLAN:   Problem List Items Addressed This Visit    None  Visit Diagnoses       Puerperal sepsis with acute hypoxic respiratory failure without septic shock ([address])    -  Primary          Caprice Daniel a 81 y/o female with a history of HTN, HLD, COPD, and colon cancer postresection presented to the ED yesterday with a deconditioning, cough, and decreased appetite in the setting of COVID-19 infection 10 days prior.               Puerperal sepsis with acute hypoxic respiratory failure without septic shock (HCC)     -COVID and influenza nasopharyngeal swabs did not detect SARS or rapid influenza strains AMB sent-patient received 2 DuoNeb treatments in the ER and stated that afterwards she felt much better however when she was ambulated, she desatted down to 59% as per Dr. Trang Weathers ER resident.  -Chest x-ray 2 views that were done in the ED showed likely minimal by basilar atelectasis  -CTA of the chest with and without contrast showed no evidence of PE  -CBC was unremarkable-did not show white count  -CMP showed GFR of 47  -Patient was afebrile throughout admission however upon exertion, patient's oxygen saturations started to decrease per Dr. Trang Weathers into the 46s  -Patient was placed on 1 L nasal cannula where she is satting at 94%  -Per daughter patient has decreased p.o. intake over the course of the last 2 weeks when she started having her COVID symptoms and feels weak  -Due to this patient's age, recent COVID stent, frail AT, we felt that this patient would benefit most from hospital admission to increase p.o. intake, optimize her oxygen status upon exertion, and explore options for

## 2023-09-30 NOTE — PROGRESS NOTES
[address] [facility]  DAILY PROGRESS NOTE      Patient:    Jessica Ramos , 80 y.o. female   MRN:  [id number]  Room/Bed:  419/01  Admission Date:   9/29/2023  Code status:  Full Code    Reason for Admission: new oxygen requirement from covid     ASSESSMENT AND PLAN:   Problem List Items Addressed This Visit    None  Visit Diagnoses       Puerperal sepsis with acute hypoxic respiratory failure without septic shock ([address])    -  Primary               Cute hypoxic respiratory failure without septic shock  -COVID and influenza nasopharyngeal swabs did not detect SARS or rapid influenza strains AMB sent-patient received 2 DuoNeb treatments in the ER and stated that afterwards she felt much better however when she was ambulated, she desatted down to 59% as per Dr. Rutherford ER resident.  -Chest x-ray 2 views that were done in the ED showed likely minimal by basilar atelectasis  -CTA of the chest with and without contrast showed no evidence of PE  -CBC was unremarkable-did not show white count  -CMP showed GFR of 47  -Patient was afebrile throughout admission however upon exertion, patient's oxygen saturations started to decrease per Dr. Rutherford into the 46s  -Patient was placed on 1 L nasal cannula where she is satting at 94%  -Per daughter patient has decreased p.o. intake over the course of the last 2 weeks when she started having her COVID symptoms and feels weak  -Due to this patient's age, recent COVID stent, frail AT, we felt that this patient would benefit most from hospital admission to increase p.o. intake, optimize her oxygen status upon exertion, and explore options for placement if PT OT recommended.  -We feel that after 24 hours patient does not improve, we then may get the pulmonary team on board.      Plan:  -Ad[facility] to [facility]  -Daily CBC, daily BMP  -Continuous pulse ox  -Strict I's and O's  -Wean oxygen as tolerated  -PT/OT/case management  -Regular diet order placed, encourage p.o. intake  -Continue Symbicort, albuterol, ipratropium     COPD  -Home regimen of Symbicort, albuterol  Plan  -Continue home regimen  -Continuous pulse ox   - Monitor oxygen status     HLD  -Home medication of pravastatin 80 mg daily  Plan:  -Continue current regimen     IRON DEFICIENCY ANEMIA   -  patient CBC today in the ER showed hemoglobin of 11.8  -Home regimen of oral iron 325  PLAN     - Continue home regimen  -Daily CBC  -Transfuse if hemoglobin drops under 7     HTN   - Home regimen diltiazem ER to 40 mg daily  -Patient does not see cardiologist this medicine is prescribed by her primary care doctor Dr. Francisca Parker :    - Continue home regimen     Vitamins  -Home regimen of vitamin C 1000 mg daily, vitamin D3 1000 IUs daily  Plan:  -Continue home regimen        Global:  Code: Full  IVF/Drips: None  I/O / Wt: Daily weights  Diet: Regular  Bowel Regimen, Last BM:  MiraLAX to be placed  DVT/AC: SCDs  Mobility: per protocol   Disposition: TBD  Anticipated LOS: 2 midnights     Point of Contact (relationship, number): Lucía Champion (Child)   [phone] (Home Phone)            SUBJECTIVE:   Events of the last 24 hours:  -Telemetry added per nursing request  -Delsym added for cough  -Cardizem and statin changed to nightly    ROS (positive findings are in BOLD; negative findings are in regular font)  Constitutional: fevers, chills, appetite changes, weight changes, fatigue  HEENT: changes in vision, changes in hearing, sore throat, dysphagia  Cardiovascular: chest pain, palpitations, PND, orthopnea, edema  Pulmonary: SOB, cough, sputum production, wheezing, chest tightness  Gastrointestinal: abdominal pain, nausea/vomiting, diarrhea, constipation, melena, hematochezia  Genitourinary: dysuria, hesitation, dribbling, urgency, hematuria  Musculoskeletal: arthralgias, myalgias  Skin: rash, itching  Neurological: sensory changes, motor changes, headache  Psychiatric: mood changes  Endocrine: heat/cold intolerance  Heme: easy bruising/easy

## 2023-09-30 NOTE — PROGRESS NOTES
[facility]   POST-ROUNDING NOTE    Assessment & Plan:  -Daily CBC, daily BMP  -Continuous pulse ox  -Strict I's and O's  -Wean oxygen as tolerated  -PT/OT/case managementencourage p.o. intake  -Continue Symbicort, albuterol, ipratropium    The above patient and plan were discussed with my supervising physician. See daily progress note for full assessment/plan.       Vanessa Chatterjee MD, PGY-3  [facility]  9/30/2023, 11:52 AM

## 2023-09-30 NOTE — PROGRESS NOTES
Patient's room air sat with O2 off was 93%. Pt's O2 SATs while ambulating dropped to 86%, pt became short of breath. Pt ambulated back to room, SATs remained 86%. O2 @ 2 liters via nasal  cannula re-applied.  O2 SATs 95%

## 2023-09-30 NOTE — CARE COORDINATION
Home health orders noted. Patient's FOC is [place]. Spoke with Marixa at [facility] and she states she is able to accept pt. HH orders placed in the queue. DME order noted for home oxygen. Oxygen ordered through [employer] via [employer] website to be delivered to pts home. The company will reach out to pt for delivery. Instructed company to please bring portable tank to pts hospital room prior to discharge.     [facility] RN HELEN  Case Management

## 2023-09-30 NOTE — CARE COORDINATION
Case Management Assessment  Initial Evaluation    Date/Time of Evaluation: 9/30/2023 3:38 PM  Assessment Completed by: Matteo Cole RN    If patient is discharged prior to next notation, then this note serves as note for discharge by case management. Patient Name: Endy Munson                   YOB: 1940  Diagnosis: Hypoxia [R09.02]  Oxygen desaturation [R09.02]  Puerperal sepsis with acute hypoxic respiratory failure without septic shock ([address]) [O85, R65.20, J96.01]                   Date / Time: 9/29/2023 11:40 AM    Patient Admission Status: Inpatient   Readmission Risk (Low < 19, Mod (19-27), High > 27): Readmission Risk Score: 13.8    Current PCP: Dimitris Pang, DO  PCP verified by CM? Yes    Chart Reviewed: Yes      History Provided by: Patient  Patient Orientation: Alert and Oriented    Patient Cognition: Alert    Hospitalization in the last 30 days (Readmission):  No    If yes, Readmission Assessment in CM Navigator will be completed. Advance Directives:      Code Status: Full Code   Patient's Primary Decision Maker is:        Discharge Planning:    Patient lives with: (P) Family Members Type of Home: (P) House (\"townhouse\")  Primary Care Giver: Self  Patient Support Systems include: Children, Family Members   Current Financial resources: Medicare, Other (Comment)  Current community resources:    Current services prior to admission: (P) None            Current DME:              Type of Home Care services:  (P) OT, PT, Skilled Therapy    ADLS  Prior functional level: Independent in ADLs/IADLs  Current functional level: Independent in ADLs/IADLs    PT AM-PAC:   /24  OT AM-PAC: 24 /24    Family can provide assistance at DC: Yes  Would you like Case Management to discuss the discharge plan with any other family members/significant others, and if so, who? Yes (daughter Bobby Harding.)  Plans to Return to Present Housing: Yes  Other Identified Issues/Barriers to RETURNING to current housing: none noted. Would you like for me to discuss the discharge plan with any other family members/significant others, and if so, who? Yes  (daughter Giulia Navarro.)   Paula Milner; Other (Comment)   Social/Functional History   Lives With Family  (step-son)   Type of [facility]  Two level; Able to Live on Main level with bedroom/bathroom   Bathroom Shower/Tub Walk-in shower   [facility] chair   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 Oxygen therapy (comment)   Potential Assistance Purchasing Medications No   Type of Home Care Services OT;PT;Skilled Therapy   Patient expects to be discharged to: House   History of falls? 0   Services At/After Discharge   Transition of Care Consult (CM Consult) [facility] Discharge PT;OT;Home [address] Provided? No   Mode of Transport at Discharge Self  (pt states that her daughter will transport her home.)   Confirm Follow Up Transport Self   Condition of Participation: Discharge Planning   The Plan for Transition of Care is related to the following treatment goals: home with family support and home health, oxygen. Freedom of Choice list was provided with basic dialogue that supports the patient's individualized plan of care/goals, treatment preferences, and shares the quality data associated with the providers?   Yes  ([facility] is [facility])

## 2023-10-01 ENCOUNTER — HOME CARE VISIT (OUTPATIENT)
Age: 83
End: 2023-10-01

## 2023-10-01 LAB
ANION GAP SERPL CALC-SCNC: 7 MMOL/L (ref 3–18)
BASOPHILS # BLD: 0 K/UL (ref 0–0.1)
BASOPHILS NFR BLD: 0 % (ref 0–2)
BUN SERPL-MCNC: 34 MG/DL (ref 7–18)
BUN/CREAT SERPL: 24 (ref 12–20)
CALCIUM SERPL-MCNC: 9 MG/DL (ref 8.5–10.1)
CHLORIDE SERPL-SCNC: 106 MMOL/L (ref 100–111)
CO2 SERPL-SCNC: 21 MMOL/L (ref 21–32)
CREAT SERPL-MCNC: 1.43 MG/DL (ref 0.6–1.3)
DIFFERENTIAL METHOD BLD: ABNORMAL
EOSINOPHIL # BLD: 0 K/UL (ref 0–0.4)
EOSINOPHIL NFR BLD: 0 % (ref 0–5)
ERYTHROCYTE [DISTWIDTH] IN BLOOD BY AUTOMATED COUNT: 14.3 % (ref 11.6–14.5)
GLUCOSE SERPL-MCNC: 118 MG/DL (ref 74–99)
HCT VFR BLD AUTO: 34.4 % (ref 35–45)
HGB BLD-MCNC: 10.3 G/DL (ref 12–16)
IMM GRANULOCYTES # BLD AUTO: 0.2 K/UL (ref 0–0.04)
IMM GRANULOCYTES NFR BLD AUTO: 1 % (ref 0–0.5)
LYMPHOCYTES # BLD: 0.5 K/UL (ref 0.9–3.6)
LYMPHOCYTES NFR BLD: 3 % (ref 21–52)
MCH RBC QN AUTO: 26.8 PG (ref 24–34)
MCHC RBC AUTO-ENTMCNC: 29.9 G/DL (ref 31–37)
MCV RBC AUTO: 89.6 FL (ref 78–100)
MONOCYTES # BLD: 0.5 K/UL (ref 0.05–1.2)
MONOCYTES NFR BLD: 2 % (ref 3–10)
NEUTS SEG # BLD: 19.2 K/UL (ref 1.8–8)
NEUTS SEG NFR BLD: 94 % (ref 40–73)
NRBC # BLD: 0 K/UL (ref 0–0.01)
NRBC BLD-RTO: 0 PER 100 WBC
PLATELET # BLD AUTO: 295 K/UL (ref 135–420)
PMV BLD AUTO: 9.4 FL (ref 9.2–11.8)
POTASSIUM SERPL-SCNC: 4.3 MMOL/L (ref 3.5–5.5)
RBC # BLD AUTO: 3.84 M/UL (ref 4.2–5.3)
SODIUM SERPL-SCNC: 134 MMOL/L (ref 136–145)
WBC # BLD AUTO: 20.4 K/UL (ref 4.6–13.2)

## 2023-10-01 PROCEDURE — 94640 AIRWAY INHALATION TREATMENT: CPT

## 2023-10-01 PROCEDURE — 36415 COLL VENOUS BLD VENIPUNCTURE: CPT

## 2023-10-01 PROCEDURE — 94761 N-INVAS EAR/PLS OXIMETRY MLT: CPT

## 2023-10-01 PROCEDURE — 6360000002 HC RX W HCPCS

## 2023-10-01 PROCEDURE — 2580000003 HC RX 258

## 2023-10-01 PROCEDURE — 6370000000 HC RX 637 (ALT 250 FOR IP)

## 2023-10-01 PROCEDURE — 2700000000 HC OXYGEN THERAPY PER DAY

## 2023-10-01 PROCEDURE — 80048 BASIC METABOLIC PNL TOTAL CA: CPT

## 2023-10-01 PROCEDURE — 85025 COMPLETE CBC W/AUTO DIFF WBC: CPT

## 2023-10-01 PROCEDURE — 1100000003 HC PRIVATE W/ TELEMETRY

## 2023-10-01 RX ORDER — SODIUM CHLORIDE, SODIUM LACTATE, POTASSIUM CHLORIDE, AND CALCIUM CHLORIDE .6; .31; .03; .02 G/100ML; G/100ML; G/100ML; G/100ML
250 INJECTION, SOLUTION INTRAVENOUS ONCE
Status: COMPLETED | OUTPATIENT
Start: 2023-10-01 | End: 2023-10-01

## 2023-10-01 RX ADMIN — PRAVASTATIN SODIUM 80 MG: 20 TABLET ORAL at 22:56

## 2023-10-01 RX ADMIN — DILTIAZEM HYDROCHLORIDE 240 MG: 240 CAPSULE, EXTENDED RELEASE ORAL at 22:56

## 2023-10-01 RX ADMIN — ARFORMOTEROL TARTRATE 15 MCG: 15 SOLUTION RESPIRATORY (INHALATION) at 21:15

## 2023-10-01 RX ADMIN — Medication 1000 UNITS: at 10:43

## 2023-10-01 RX ADMIN — LOPERAMIDE HYDROCHLORIDE 2 MG: 2 CAPSULE ORAL at 10:37

## 2023-10-01 RX ADMIN — BUDESONIDE 500 MCG: 0.5 INHALANT RESPIRATORY (INHALATION) at 21:15

## 2023-10-01 RX ADMIN — SODIUM CHLORIDE, POTASSIUM CHLORIDE, SODIUM LACTATE AND CALCIUM CHLORIDE 250 ML: 600; 310; 30; 20 INJECTION, SOLUTION INTRAVENOUS at 08:26

## 2023-10-01 RX ADMIN — SODIUM CHLORIDE, PRESERVATIVE FREE 10 ML: 5 INJECTION INTRAVENOUS at 08:28

## 2023-10-01 RX ADMIN — DEXTROMETHORPHAN 30 MG: 30 SUSPENSION, EXTENDED RELEASE ORAL at 22:57

## 2023-10-01 RX ADMIN — BUDESONIDE 500 MCG: 0.5 INHALANT RESPIRATORY (INHALATION) at 07:52

## 2023-10-01 RX ADMIN — Medication 1000 MG: at 10:39

## 2023-10-01 RX ADMIN — PREDNISONE 40 MG: 20 TABLET ORAL at 10:37

## 2023-10-01 RX ADMIN — DEXTROMETHORPHAN 30 MG: 30 SUSPENSION, EXTENDED RELEASE ORAL at 10:43

## 2023-10-01 RX ADMIN — ARFORMOTEROL TARTRATE 15 MCG: 15 SOLUTION RESPIRATORY (INHALATION) at 07:52

## 2023-10-01 NOTE — PROGRESS NOTES
[address] [facility]  DAILY PROGRESS NOTE      Patient:    Aminah Vargas , 80 y.o. female   MRN:  [id number]  Room/Bed:  419/01  Admission Date:   9/29/2023  Code status:  Full Code    Reason for Admission: new oxygen requirement from covid     ASSESSMENT AND PLAN:   Problem List Items Addressed This Visit    None  Visit Diagnoses       Puerperal sepsis with acute hypoxic respiratory failure without septic shock ([address])    -  Primary               Cute hypoxic respiratory failure without septic shock  -COVID and influenza nasopharyngeal swabs did not detect SARS or rapid influenza strains AMB sent-patient received 2 DuoNeb treatments in the ER and stated that afterwards she felt much better however when she was ambulated, she desatted down to 59% as per  [facility] ER resident.  -Chest x-ray 2 views that were done in the ED showed likely minimal by basilar atelectasis  -CTA of the chest with and without contrast showed no evidence of PE  -CBC was unremarkable-did not show white count  -CMP showed GFR of 47  -Patient was afebrile throughout admission however upon exertion, patient's oxygen saturations started to decrease per Dr. MEDINA [facility] into the 46s  -Patient was placed on 1 L nasal cannula where she is satting at 94%  -Per daughter patient has decreased p.o. intake over the course of the last 2 weeks when she started having her COVID symptoms and feels weak  -Due to this patient's age, recent COVID stent, frail AT, we felt that this patient would benefit most from hospital admission to increase p.o. intake, optimize her oxygen status upon exertion, and explore options for placement if PT OT recommended.  -We feel that after 24 hours patient does not improve, we then may get the pulmonary team on board.     Plan:  -Daily CBC, daily BMP  -Continuous pulse ox  -Strict I's and O's  -Wean oxygen as tolerated  -PT/OT/case management  -Regular diet order placed, encourage p.o. intake  -Continue Symbicort, albuterol,

## 2023-10-01 NOTE — PROGRESS NOTES
[facility]   POST-ROUNDING NOTE    Assessment & Plan:   - Patient has a newfound BRANDY and we feel that patient may benefit from another night in the hospital so we could optimize her p.o. fluid intake before discharge    The above patient and plan were discussed with my supervising physician. See daily progress note for full assessment/plan.       Manuela Cavazos MD, PGY-3  [facility]  10/1/2023, 11:39 AM

## 2023-10-02 LAB
ANION GAP SERPL CALC-SCNC: 8 MMOL/L (ref 3–18)
ANION GAP SERPL CALC-SCNC: 9 MMOL/L (ref 3–18)
BUN SERPL-MCNC: 47 MG/DL (ref 7–18)
BUN SERPL-MCNC: 47 MG/DL (ref 7–18)
BUN/CREAT SERPL: 26 (ref 12–20)
BUN/CREAT SERPL: 29 (ref 12–20)
CALCIUM SERPL-MCNC: 8.3 MG/DL (ref 8.5–10.1)
CALCIUM SERPL-MCNC: 8.7 MG/DL (ref 8.5–10.1)
CHLORIDE SERPL-SCNC: 108 MMOL/L (ref 100–111)
CHLORIDE SERPL-SCNC: 109 MMOL/L (ref 100–111)
CO2 SERPL-SCNC: 22 MMOL/L (ref 21–32)
CO2 SERPL-SCNC: 23 MMOL/L (ref 21–32)
CREAT SERPL-MCNC: 1.62 MG/DL (ref 0.6–1.3)
CREAT SERPL-MCNC: 1.79 MG/DL (ref 0.6–1.3)
CREAT UR-MCNC: 139 MG/DL (ref 30–125)
ERYTHROCYTE [DISTWIDTH] IN BLOOD BY AUTOMATED COUNT: 14.3 % (ref 11.6–14.5)
GLUCOSE SERPL-MCNC: 106 MG/DL (ref 74–99)
GLUCOSE SERPL-MCNC: 96 MG/DL (ref 74–99)
HCT VFR BLD AUTO: 32.8 % (ref 35–45)
HGB BLD-MCNC: 10.1 G/DL (ref 12–16)
MCH RBC QN AUTO: 27.3 PG (ref 24–34)
MCHC RBC AUTO-ENTMCNC: 30.8 G/DL (ref 31–37)
MCV RBC AUTO: 88.6 FL (ref 78–100)
NRBC # BLD: 0.02 K/UL (ref 0–0.01)
NRBC BLD-RTO: 0.1 PER 100 WBC
PLATELET # BLD AUTO: 326 K/UL (ref 135–420)
PMV BLD AUTO: 9.6 FL (ref 9.2–11.8)
POTASSIUM SERPL-SCNC: 3.4 MMOL/L (ref 3.5–5.5)
POTASSIUM SERPL-SCNC: 4 MMOL/L (ref 3.5–5.5)
RBC # BLD AUTO: 3.7 M/UL (ref 4.2–5.3)
SODIUM SERPL-SCNC: 139 MMOL/L (ref 136–145)
SODIUM SERPL-SCNC: 140 MMOL/L (ref 136–145)
SODIUM UR-SCNC: 12 MMOL/L (ref 20–110)
WBC # BLD AUTO: 16.5 K/UL (ref 4.6–13.2)

## 2023-10-02 PROCEDURE — 36415 COLL VENOUS BLD VENIPUNCTURE: CPT

## 2023-10-02 PROCEDURE — 6370000000 HC RX 637 (ALT 250 FOR IP)

## 2023-10-02 PROCEDURE — 97162 PT EVAL MOD COMPLEX 30 MIN: CPT

## 2023-10-02 PROCEDURE — 82570 ASSAY OF URINE CREATININE: CPT

## 2023-10-02 PROCEDURE — 6360000002 HC RX W HCPCS

## 2023-10-02 PROCEDURE — 85027 COMPLETE CBC AUTOMATED: CPT

## 2023-10-02 PROCEDURE — 80048 BASIC METABOLIC PNL TOTAL CA: CPT

## 2023-10-02 PROCEDURE — 1100000003 HC PRIVATE W/ TELEMETRY

## 2023-10-02 PROCEDURE — 84300 ASSAY OF URINE SODIUM: CPT

## 2023-10-02 PROCEDURE — 94761 N-INVAS EAR/PLS OXIMETRY MLT: CPT

## 2023-10-02 PROCEDURE — 94640 AIRWAY INHALATION TREATMENT: CPT

## 2023-10-02 PROCEDURE — 2700000000 HC OXYGEN THERAPY PER DAY

## 2023-10-02 PROCEDURE — 2580000003 HC RX 258

## 2023-10-02 RX ORDER — IPRATROPIUM BROMIDE AND ALBUTEROL SULFATE 2.5; .5 MG/3ML; MG/3ML
1 SOLUTION RESPIRATORY (INHALATION)
Status: DISCONTINUED | OUTPATIENT
Start: 2023-10-02 | End: 2023-10-03

## 2023-10-02 RX ORDER — SODIUM CHLORIDE, SODIUM LACTATE, POTASSIUM CHLORIDE, AND CALCIUM CHLORIDE .6; .31; .03; .02 G/100ML; G/100ML; G/100ML; G/100ML
500 INJECTION, SOLUTION INTRAVENOUS ONCE
Status: COMPLETED | OUTPATIENT
Start: 2023-10-02 | End: 2023-10-02

## 2023-10-02 RX ORDER — HYDROCODONE POLISTIREX AND CHLORPHENIRAMINE POLISTIREX 10; 8 MG/5ML; MG/5ML
5 SUSPENSION, EXTENDED RELEASE ORAL NIGHTLY
Status: DISCONTINUED | OUTPATIENT
Start: 2023-10-02 | End: 2023-10-02

## 2023-10-02 RX ORDER — FERROUS SULFATE 325(65) MG
325 TABLET ORAL 2 TIMES DAILY WITH MEALS
Status: DISCONTINUED | OUTPATIENT
Start: 2023-10-02 | End: 2023-10-03

## 2023-10-02 RX ORDER — FERROUS SULFATE 325(65) MG
325 TABLET ORAL DAILY
Status: DISCONTINUED | OUTPATIENT
Start: 2023-10-02 | End: 2023-10-03 | Stop reason: HOSPADM

## 2023-10-02 RX ORDER — POTASSIUM CHLORIDE 20 MEQ/1
20 TABLET, EXTENDED RELEASE ORAL ONCE
Status: COMPLETED | OUTPATIENT
Start: 2023-10-02 | End: 2023-10-02

## 2023-10-02 RX ADMIN — PREDNISONE 40 MG: 20 TABLET ORAL at 11:12

## 2023-10-02 RX ADMIN — IPRATROPIUM BROMIDE AND ALBUTEROL SULFATE 1 DOSE: 2.5; .5 SOLUTION RESPIRATORY (INHALATION) at 19:33

## 2023-10-02 RX ADMIN — PRAVASTATIN SODIUM 80 MG: 20 TABLET ORAL at 21:26

## 2023-10-02 RX ADMIN — IPRATROPIUM BROMIDE AND ALBUTEROL SULFATE 1 DOSE: 2.5; .5 SOLUTION RESPIRATORY (INHALATION) at 15:41

## 2023-10-02 RX ADMIN — ARFORMOTEROL TARTRATE 15 MCG: 15 SOLUTION RESPIRATORY (INHALATION) at 07:55

## 2023-10-02 RX ADMIN — FERROUS SULFATE TAB 325 MG (65 MG ELEMENTAL FE) 325 MG: 325 (65 FE) TAB at 11:11

## 2023-10-02 RX ADMIN — IPRATROPIUM BROMIDE AND ALBUTEROL SULFATE 1 DOSE: 2.5; .5 SOLUTION RESPIRATORY (INHALATION) at 12:56

## 2023-10-02 RX ADMIN — Medication 1000 MG: at 11:07

## 2023-10-02 RX ADMIN — BUDESONIDE 500 MCG: 0.5 INHALANT RESPIRATORY (INHALATION) at 19:33

## 2023-10-02 RX ADMIN — LOPERAMIDE HYDROCHLORIDE 2 MG: 2 CAPSULE ORAL at 11:10

## 2023-10-02 RX ADMIN — SODIUM CHLORIDE, POTASSIUM CHLORIDE, SODIUM LACTATE AND CALCIUM CHLORIDE 500 ML: 600; 310; 30; 20 INJECTION, SOLUTION INTRAVENOUS at 09:24

## 2023-10-02 RX ADMIN — Medication 1000 UNITS: at 11:10

## 2023-10-02 RX ADMIN — DEXTROMETHORPHAN 30 MG: 30 SUSPENSION, EXTENDED RELEASE ORAL at 21:26

## 2023-10-02 RX ADMIN — DILTIAZEM HYDROCHLORIDE 240 MG: 240 CAPSULE, EXTENDED RELEASE ORAL at 21:26

## 2023-10-02 RX ADMIN — POTASSIUM CHLORIDE 20 MEQ: 1500 TABLET, EXTENDED RELEASE ORAL at 18:55

## 2023-10-02 RX ADMIN — BUDESONIDE 500 MCG: 0.5 INHALANT RESPIRATORY (INHALATION) at 07:55

## 2023-10-02 RX ADMIN — SODIUM CHLORIDE, POTASSIUM CHLORIDE, SODIUM LACTATE AND CALCIUM CHLORIDE 500 ML: 600; 310; 30; 20 INJECTION, SOLUTION INTRAVENOUS at 15:37

## 2023-10-02 RX ADMIN — ARFORMOTEROL TARTRATE 15 MCG: 15 SOLUTION RESPIRATORY (INHALATION) at 19:32

## 2023-10-02 RX ADMIN — FERROUS SULFATE TAB 325 MG (65 MG ELEMENTAL FE) 325 MG: 325 (65 FE) TAB at 18:55

## 2023-10-02 RX ADMIN — DEXTROMETHORPHAN 30 MG: 30 SUSPENSION, EXTENDED RELEASE ORAL at 11:14

## 2023-10-02 NOTE — PROGRESS NOTES
[facility]  POST-ROUNDING NOTE    Assessment & Plan:    -6MWT to be performed by nursing    -continue prednisone 40 mg for 2 more days; will continue extending in the outpatient setting if respiratory symptoms do not improve    -BUN 26 Cr 1.79; will order another 500cc LR and monitor BMP    -plans to discharge tomorrow on Combivent     The above patient and plan were discussed with my supervising physician. See daily progress note for full assessment/plan.       Aleks Quiñonez MD, PGY-1  [facility]  10/2/2023, 2:13 PM

## 2023-10-02 NOTE — PROGRESS NOTES
conducted an initial consultation and Spiritual Assessment for Nancy Smith, who is a 80 y.o.,female. Patient's Primary Language is: [place]. According to the patient's EMR Christian Affiliation is: Christian. The reason the Patient came to the hospital is:   Patient Active Problem List    Diagnosis Date Noted    Hypoxia 09/29/2023    Oxygen desaturation 09/29/2023    Lump or mass in breast 11/30/2015    Ureteral stone 09/15/2014    Ureterovesical junction obstruction 08/22/2014    HLD (hyperlipidemia) 08/22/2014    HTN (hypertension) 08/22/2014    COPD (chronic obstructive pulmonary disease) ([address] 08/22/2014    Hydronephrosis of right kidney 08/22/2014    BRANDY (acute kidney injury) ([address] [facility]) 08/22/2014    Elevated troponin 08/22/2014    Colon cancer ([address] [facility]) 06/14/2013    S/P colon resection 06/06/2013        The  provided the following Interventions:  Initiated a relationship of care and support. Chart reviewed. The following outcomes where achieved:  Patient expressed gratitude for 's visit. Assessment:  Patient does not have any Jew/cultural needs that will affect patient's preferences in health care. There are no spiritual or Jew issues which require intervention at this time. Plan:  Chaplains will continue to follow and will provide pastoral care on an as needed/requested basis.  recommends bedside caregivers page  on duty if patient shows signs of acute spiritual or emotional distress.     [facility]   [phone]

## 2023-10-02 NOTE — CARE COORDINATION
LICO checked [facility]  with [facility] on Home Oxygen delivery. 09/30/2023 [facility] said they were scheduling delivery. LICO messaged today, and asking for any update on Home Oxygen delivery for patient to discharge.                  Denevr Morley RN  Case Management [phone]

## 2023-10-02 NOTE — PROGRESS NOTES
[address] [facility]  DAILY PROGRESS NOTE      Patient:    Aminah Vargas , 80 y.o. female   MRN:  [id number]  Room/Bed:  419/01  Admission Date:   9/29/2023  Code status:  Full Code    Reason for Admission: new oxygen requirement from covid     ASSESSMENT AND PLAN:   Problem List Items Addressed This Visit    None  Visit Diagnoses       Puerperal sepsis with acute hypoxic respiratory failure without septic shock ([address])    -  Primary               Cute hypoxic respiratory failure without septic shock  -COVID and influenza nasopharyngeal swabs did not detect SARS or rapid influenza strains AMB sent-patient received 2 DuoNeb treatments in the ER and stated that afterwards she felt much better however when she was ambulated, she desatted down to 59% as per Dr. MEDINA [facility] ER resident.  -Chest x-ray 2 views that were done in the ED showed likely minimal by basilar atelectasis  -CTA of the chest with and without contrast showed no evidence of PE  -CBC was unremarkable-did not show white count  -CMP showed GFR of 47  -Patient was afebrile throughout admission however upon exertion, patient's oxygen saturations started to decrease per Dr. MEDINA [facility] into the 46s  -Patient was placed on 1 L nasal cannula where she is satting at 94%  -Per daughter patient has decreased p.o. intake over the course of the last 2 weeks when she started having her COVID symptoms and feels weak  -Due to this patient's age, recent COVID stent, frail AT, we felt that this patient would benefit most from hospital admission to increase p.o. intake, optimize her oxygen status upon exertion, and explore options for placement if PT OT recommended.  -We feel that after 24 hours patient does not improve, we then may get the pulmonary team on board.  -10/2: patient satting in low 90s since admission; as per [facility], patient has not received any form of LAMA since 9/29  -home O2 ordered; delivered on 10/1 as per patient; however, O2 requirement has not yet been

## 2023-10-02 NOTE — PROGRESS NOTES
Physician Progress Note      PATIENTCaramber Given  CSN #:                  [id number]  :                       1940  ADMIT DATE:       2023 11:40 AM  DISCH DATE:  RESPONDING  PROVIDER #:        Jessie Ruth MD          QUERY TEXT:    Patient admitted with acute hypoxic respiratory failure. Noted documentation   of sepsis in H&P on 2023. If possible, please document in progress notes   and discharge summary the source of sepsis:    The medical record reflects the following:  Risk Factors: advanced age, COPD, acute respiratory failure, history of colon   cancer, COVID-19 positive 1 week ago    Clinical Indicators:  2023, H&P, Dr. Miguel Wells MD:  Sujatha.K[age] y.o. female with history of colon   cancer status postresection, hypertension, high cholesterol, hyperlipidemia,   COPD presented to the emergency department with cough, decreased appetite in   the setting of testing positive for COVID just over a week ago. Puerperal sepsis with acute hypoxic respiratory failure without septic shock   ([address] \"  2023, CT of chest report:  2. Bronchial wall thickening multiple segments   of the bilateral lower lobes with endoluminal debris and opacification   several segments as above. There is no regional infiltrate or evidence of   postobstructive atelectasis, however. Possible reactive airway changes versus   bronchitis. *WBC:  10.4 --> 22.4 --> 20.4 --> 16.5  temperature range:  97.5F - 98.7F degrees  *Heart rate:  >90 bpm on admission    Treatment: methylprednisolone IV --> prednisone oral, inhaler q4h, nebulizer   2x/day, IV bolus    Thank you,  SHERLY Flores RN, [email or web address]  Options provided:  -- Sepsis, present on admission, due to, Please document source. -- SIRS confirmed, present on admission, and sepsis ruled out  -- Sepsis, present on admission, now resolved, due to, Please document source.   -- Other - I will add my own diagnosis  -- Disagree - Not applicable / Not

## 2023-10-03 VITALS
RESPIRATION RATE: 20 BRPM | WEIGHT: 143.3 LBS | DIASTOLIC BLOOD PRESSURE: 80 MMHG | HEART RATE: 83 BPM | BODY MASS INDEX: 28.89 KG/M2 | TEMPERATURE: 97.9 F | OXYGEN SATURATION: 94 % | HEIGHT: 59 IN | SYSTOLIC BLOOD PRESSURE: 172 MMHG

## 2023-10-03 LAB
ANION GAP SERPL CALC-SCNC: 4 MMOL/L (ref 3–18)
ANION GAP SERPL CALC-SCNC: 4 MMOL/L (ref 3–18)
BUN SERPL-MCNC: 39 MG/DL (ref 7–18)
BUN SERPL-MCNC: 50 MG/DL (ref 7–18)
BUN/CREAT SERPL: 26 (ref 12–20)
BUN/CREAT SERPL: 26 (ref 12–20)
CALCIUM SERPL-MCNC: 8.6 MG/DL (ref 8.5–10.1)
CALCIUM SERPL-MCNC: 9 MG/DL (ref 8.5–10.1)
CHLORIDE SERPL-SCNC: 113 MMOL/L (ref 100–111)
CHLORIDE SERPL-SCNC: 113 MMOL/L (ref 100–111)
CO2 SERPL-SCNC: 20 MMOL/L (ref 21–32)
CO2 SERPL-SCNC: 24 MMOL/L (ref 21–32)
CREAT SERPL-MCNC: 1.49 MG/DL (ref 0.6–1.3)
CREAT SERPL-MCNC: 1.9 MG/DL (ref 0.6–1.3)
ERYTHROCYTE [DISTWIDTH] IN BLOOD BY AUTOMATED COUNT: 14.5 % (ref 11.6–14.5)
GLUCOSE SERPL-MCNC: 128 MG/DL (ref 74–99)
GLUCOSE SERPL-MCNC: 83 MG/DL (ref 74–99)
HCT VFR BLD AUTO: 32.3 % (ref 35–45)
HGB BLD-MCNC: 9.8 G/DL (ref 12–16)
MCH RBC QN AUTO: 27 PG (ref 24–34)
MCHC RBC AUTO-ENTMCNC: 30.3 G/DL (ref 31–37)
MCV RBC AUTO: 89 FL (ref 78–100)
NRBC # BLD: 0.03 K/UL (ref 0–0.01)
NRBC BLD-RTO: 0.2 PER 100 WBC
PLATELET # BLD AUTO: 317 K/UL (ref 135–420)
PMV BLD AUTO: 9.2 FL (ref 9.2–11.8)
POTASSIUM SERPL-SCNC: 3.6 MMOL/L (ref 3.5–5.5)
POTASSIUM SERPL-SCNC: 4.1 MMOL/L (ref 3.5–5.5)
RBC # BLD AUTO: 3.63 M/UL (ref 4.2–5.3)
SODIUM SERPL-SCNC: 137 MMOL/L (ref 136–145)
SODIUM SERPL-SCNC: 141 MMOL/L (ref 136–145)
WBC # BLD AUTO: 16 K/UL (ref 4.6–13.2)

## 2023-10-03 PROCEDURE — 6370000000 HC RX 637 (ALT 250 FOR IP)

## 2023-10-03 PROCEDURE — 94640 AIRWAY INHALATION TREATMENT: CPT

## 2023-10-03 PROCEDURE — 80048 BASIC METABOLIC PNL TOTAL CA: CPT

## 2023-10-03 PROCEDURE — 6360000002 HC RX W HCPCS

## 2023-10-03 PROCEDURE — 36415 COLL VENOUS BLD VENIPUNCTURE: CPT

## 2023-10-03 PROCEDURE — 94761 N-INVAS EAR/PLS OXIMETRY MLT: CPT

## 2023-10-03 PROCEDURE — 2700000000 HC OXYGEN THERAPY PER DAY

## 2023-10-03 PROCEDURE — 2580000003 HC RX 258

## 2023-10-03 PROCEDURE — 51798 US URINE CAPACITY MEASURE: CPT

## 2023-10-03 PROCEDURE — 85027 COMPLETE CBC AUTOMATED: CPT

## 2023-10-03 RX ORDER — PREDNISONE 20 MG/1
20 TABLET ORAL DAILY
Qty: 4 TABLET | Refills: 0 | Status: SHIPPED | OUTPATIENT
Start: 2023-10-04 | End: 2023-10-08

## 2023-10-03 RX ORDER — SODIUM CHLORIDE, SODIUM LACTATE, POTASSIUM CHLORIDE, AND CALCIUM CHLORIDE .6; .31; .03; .02 G/100ML; G/100ML; G/100ML; G/100ML
1000 INJECTION, SOLUTION INTRAVENOUS ONCE
Status: COMPLETED | OUTPATIENT
Start: 2023-10-03 | End: 2023-10-03

## 2023-10-03 RX ORDER — IPRATROPIUM BROMIDE AND ALBUTEROL SULFATE 2.5; .5 MG/3ML; MG/3ML
1 SOLUTION RESPIRATORY (INHALATION)
Status: DISCONTINUED | OUTPATIENT
Start: 2023-10-03 | End: 2023-10-03 | Stop reason: HOSPADM

## 2023-10-03 RX ORDER — DEXTROMETHORPHAN POLISTIREX 30 MG/5ML
30 SUSPENSION ORAL EVERY 12 HOURS SCHEDULED
Qty: 100 ML | Refills: 0 | Status: SHIPPED | OUTPATIENT
Start: 2023-10-03 | End: 2023-10-13

## 2023-10-03 RX ORDER — SODIUM CHLORIDE 9 MG/ML
INJECTION, SOLUTION INTRAVENOUS CONTINUOUS
Status: DISCONTINUED | OUTPATIENT
Start: 2023-10-03 | End: 2023-10-03 | Stop reason: HOSPADM

## 2023-10-03 RX ORDER — PREDNISONE 20 MG/1
20 TABLET ORAL DAILY
Qty: 2 TABLET | Refills: 0 | Status: SHIPPED | OUTPATIENT
Start: 2023-10-04 | End: 2023-10-03 | Stop reason: SDUPTHER

## 2023-10-03 RX ORDER — ONDANSETRON 4 MG/1
4 TABLET, ORALLY DISINTEGRATING ORAL EVERY 8 HOURS PRN
Qty: 120 TABLET | Refills: 1 | Status: SHIPPED | OUTPATIENT
Start: 2023-10-03 | End: 2023-10-03 | Stop reason: SDUPTHER

## 2023-10-03 RX ORDER — ONDANSETRON 4 MG/1
4 TABLET, ORALLY DISINTEGRATING ORAL EVERY 8 HOURS PRN
Qty: 60 TABLET | Refills: 1 | Status: SHIPPED | OUTPATIENT
Start: 2023-10-03

## 2023-10-03 RX ADMIN — SODIUM CHLORIDE: 9 INJECTION, SOLUTION INTRAVENOUS at 04:38

## 2023-10-03 RX ADMIN — Medication 1000 UNITS: at 10:05

## 2023-10-03 RX ADMIN — IPRATROPIUM BROMIDE AND ALBUTEROL SULFATE 1 DOSE: .5; 3 SOLUTION RESPIRATORY (INHALATION) at 15:44

## 2023-10-03 RX ADMIN — ARFORMOTEROL TARTRATE 15 MCG: 15 SOLUTION RESPIRATORY (INHALATION) at 10:35

## 2023-10-03 RX ADMIN — SODIUM CHLORIDE, POTASSIUM CHLORIDE, SODIUM LACTATE AND CALCIUM CHLORIDE 1000 ML: 600; 310; 30; 20 INJECTION, SOLUTION INTRAVENOUS at 10:25

## 2023-10-03 RX ADMIN — FERROUS SULFATE TAB 325 MG (65 MG ELEMENTAL FE) 325 MG: 325 (65 FE) TAB at 10:05

## 2023-10-03 RX ADMIN — DEXTROMETHORPHAN 30 MG: 30 SUSPENSION, EXTENDED RELEASE ORAL at 10:05

## 2023-10-03 RX ADMIN — PREDNISONE 40 MG: 20 TABLET ORAL at 10:05

## 2023-10-03 RX ADMIN — Medication 1000 MG: at 10:05

## 2023-10-03 RX ADMIN — BUDESONIDE 500 MCG: 0.5 INHALANT RESPIRATORY (INHALATION) at 10:35

## 2023-10-03 NOTE — HOME CARE
Received home health referral for [facility] for SN,PT,OT. Discharge order noted for today; spoke to patient and her daughter Arianna Lantigua in room, Verified demographics,explained [facility] services ,answered all questions and provided patient with [facility] contact card ; Patient states she has DME:home O2 and portable O2 has been ordered from [employer] and delivered to patients home and pt has RW; [facility] referral processed to [facility] central Intake and scheduling . LUCY MÁRQUEZ LPN.

## 2023-10-03 NOTE — PROGRESS NOTES
[address] [facility]  DAILY PROGRESS NOTE      Patient:    Hayley Andres , 80 y.o. female   MRN:  [id number]  Room/Bed:  419/01  Admission Date:   9/29/2023  Code status:  Full Code    Reason for Admission: new oxygen requirement from covid     ASSESSMENT AND PLAN:   Problem List Items Addressed This Visit    None  Visit Diagnoses       Puerperal sepsis with acute hypoxic respiratory failure without septic shock ([address])    -  Primary               Cute hypoxic respiratory failure without septic shock  -COVID and influenza nasopharyngeal swabs did not detect SARS or rapid influenza strains AMB sent-patient received 2 DuoNeb treatments in the ER and stated that afterwards she felt much better however when she was ambulated, she desatted down to 59% as per Dr. Abhilash Holt ER resident.  -Chest x-ray 2 views that were done in the ED showed likely minimal by basilar atelectasis  -CTA of the chest with and without contrast showed no evidence of PE  -CBC was unremarkable-did not show white count  -CMP showed GFR of 47  -Patient was afebrile throughout admission however upon exertion, patient's oxygen saturations started to decrease per Dr. Abhilash Holt into the 46s  -Patient was placed on 1 L nasal cannula where she is satting at 94%  -Per daughter patient has decreased p.o. intake over the course of the last 2 weeks when she started having her COVID symptoms and feels weak  -Due to this patient's age, recent COVID stent, frail AT, we felt that this patient would benefit most from hospital admission to increase p.o. intake, optimize her oxygen status upon exertion, and explore options for placement if PT OT recommended.  -We feel that after 24 hours patient does not improve, we then may get the pulmonary team on board.  -home O2 ordered and delivered on 10/2  as per CM  -gave incentive spirometer      Plan:  -Daily CBC, daily BMP  -Continuous pulse ox  -Strict I's and O's  -Wean oxygen as tolerated  -PT/OT/case

## 2023-10-03 NOTE — PROGRESS NOTES
[address] [facility]  DAILY PROGRESS NOTE      Patient:    Kofi Miller , 80 y.o. female   MRN:  [id number]  Room/Bed:  419/01  Admission Date:   9/29/2023  Code status:  Full Code    Reason for Admission: new oxygen requirement from covid     ASSESSMENT AND PLAN:   Problem List Items Addressed This Visit    None  Visit Diagnoses       Puerperal sepsis with acute hypoxic respiratory failure without septic shock ([address])    -  Primary               Cute hypoxic respiratory failure without septic shock  -COVID and influenza nasopharyngeal swabs did not detect SARS or rapid influenza strains AMB sent-patient received 2 DuoNeb treatments in the ER and stated that afterwards she felt much better however when she was ambulated, she desatted down to 59% as per Dr. Harshal Wong ER resident.  -Chest x-ray 2 views that were done in the ED showed likely minimal by basilar atelectasis  -CTA of the chest with and without contrast showed no evidence of PE  -CBC was unremarkable-did not show white count  -CMP showed GFR of 47  -Patient was afebrile throughout admission however upon exertion, patient's oxygen saturations started to decrease per Dr. Harshal Wong into the 46s  -Patient was placed on 1 L nasal cannula where she is satting at 94%  -Per daughter patient has decreased p.o. intake over the course of the last 2 weeks when she started having her COVID symptoms and feels weak  -Due to this patient's age, recent COVID stent, frail AT, we felt that this patient would benefit most from hospital admission to increase p.o. intake, optimize her oxygen status upon exertion, and explore options for placement if PT OT recommended.  -We feel that after 24 hours patient does not improve, we then may get the pulmonary team on board.  -home O2 ordered and delivered on 10/2  as per CM  -gave incentive spirometer      Plan:  -Daily CBC, daily BMP  -Continuous pulse ox  -Strict I's and O's  -Wean oxygen as tolerated  -PT/OT/case provider] aspirin chewable tablet 81 mg  81 mg Oral Daily Wyatt Foster MD        loperamide (IMODIUM) capsule 2 mg  2 mg Oral Daily Wyatt Foster MD   2 mg at 10/02/23 1110    Vitamin D (CHOLECALCIFEROL) tablet 1,000 Units  1,000 Int'l Units Oral Daily Wyatt Foster MD   1,000 Units at 10/02/23 1110    sodium chloride flush 0.9 % injection 5-40 mL  5-40 mL IntraVENous PRN Wyatt Foster MD   10 mL at 10/01/23 0828    0.9 % sodium chloride infusion   IntraVENous PRN Wyatt Foster MD        ondansetron (ZOFRAN-ODT) disintegrating tablet 4 mg  4 mg Oral Q8H PRN Wyatt Foster MD        Or    ondansetron (ZOFRAN) injection 4 mg  4 mg IntraVENous Q6H PRN Wyatt Foster MD        polyethylene glycol (GLYCOLAX) packet 17 g  17 g Oral Daily PRN Wyatt Foster MD        acetaminophen (TYLENOL) tablet 650 mg  650 mg Oral Q6H PRN Wyatt Foster MD        Or    acetaminophen (TYLENOL) suppository 650 mg  650 mg Rectal Q6H PRN Wyatt Foster MD        potassium chloride (KLOR-CON M) extended release tablet 40 mEq  40 mEq Oral PRN Wyatt Foster MD        Or    potassium bicarb-citric acid (EFFER-K) effervescent tablet 40 mEq  40 mEq Oral PRN Wyatt Foster MD        Or    potassium chloride 10 mEq/100 mL IVPB (Peripheral Line)  10 mEq IntraVENous PRN Wyatt Foster MD        potassium chloride 10 mEq/100 mL IVPB (Peripheral Line)  10 mEq IntraVENous PRN Wyatt Foster MD        magnesium sulfate 2000 mg in 50 mL IVPB premix  2,000 mg IntraVENous PRN Wyatt Foster MD        albuterol (PROVENTIL) (2.5 MG/3ML) 0.083% nebulizer solution 2.5 mg  2.5 mg Nebulization Q6H PRN Wyatt Foster MD        arformoterol tartrate (BROVANA) nebulizer solution 15 mcg  15 mcg Nebulization BID RT Wyatt Foster MD   15 mcg at 10/02/23 1932    budesonide (PULMICORT) nebulizer suspension 500 mcg  0.5 mg Nebulization BID RT Wyatt Foster MD   500 mcg at 10/02/23 1933    dextromethorphan (DELSYM) 30 MG/5ML extended release

## 2023-10-03 NOTE — PROGRESS NOTES
Spoke with Tiffany Foreman, a nurse that was working on [employer] this afternoon about Ms. Shaun Pepe 6MWT. Tiffany Foreman confirmed that Shaheed Pritchard, a nurse working yesterday performed the test. Tiffany Foreman called Shaheed Pritchard, who reported that the patient was satting 93% with ambulation on 2 L O2.

## 2023-10-03 NOTE — DISCHARGE INSTRUCTIONS
DISCHARGE SUMMARY from Nurse    PATIENT INSTRUCTIONS:    What to do at Home:  Recommended activity: activity as tolerated    If you experience any of the following symptoms: shortness of breath, chest pain, please follow up with ***. *  Please give a list of your current medications to your Primary Care Provider. *  Please update this list whenever your medications are discontinued, doses are      changed, or new medications (including over-the-counter products) are added. *  Please carry medication information at all times in case of emergency situations. These are general instructions for a healthy lifestyle:    No smoking/ No tobacco products/ Avoid exposure to second hand smoke  Surgeon General's Warning:  Quitting smoking now greatly reduces serious risk to your health. Obesity, smoking, and sedentary lifestyle greatly increases your risk for illness    A healthy diet, regular physical exercise & weight monitoring are important for maintaining a healthy lifestyle    You may be retaining fluid if you have a history of heart failure or if you experience any of the following symptoms:  Weight gain of 3 pounds or more overnight or 5 pounds in a week, increased swelling in our hands or feet or shortness of breath while lying flat in bed. Please call your doctor as soon as you notice any of these symptoms; do not wait until your next office visit. The discharge information has been reviewed with the {PATIENT PARENT GUARDIAN:25436}. The {PATIENT PARENT GUARDIAN:94754} verbalized understanding. Discharge medications reviewed with the {[employer] meds reviewed XL:56347} and appropriate educational materials and side effects teaching were provided.   ___________________________________________________________________________________________________________________________________

## 2023-10-03 NOTE — PROGRESS NOTES
[facility]  POST-ROUNDING NOTE    Assessment & Plan:    -plans to discharge home today after 6MWT results are documented; as per nurse Juanita Preston, patient satted 93% on 2L O2     -will  patient to take 1/2 of prednisone     The above patient and plan were discussed with my supervising physician. See daily progress note for full assessment/plan.       Darryl Fernandez MD, PGY-1  [facility]  10/3/2023, 12:25 PM

## 2023-10-03 NOTE — CARE COORDINATION
CM spoke with [facility] with [facility], and updated that patient is discharging today.            Collin Ayalao, RN  Case Management [phone]

## 2023-10-03 NOTE — CARE COORDINATION
CM spoke with patient's daughter Julius Coffman, said she is in the hospital, and will be transporting patient home today for discharge.              Benjamin Garcia, RASHIDA  Case Management [phone]

## 2023-10-03 NOTE — CARE COORDINATION
Discharge order noted for today. Pt has been accepted to [facility] agency. Spoke with patient's daughter and she is agreeable to the transition plan today. Transport has been arranged through patient's daughter. Patient's home health  orders have been forwarded to [facility] home health  agency via Nohemi Bundy.  Discharge information has been documented on the AVS.           Collin George, RN  Case Management [phone]

## 2023-10-03 NOTE — PROGRESS NOTES
[facility]  DAILY PROGRESS NOTE  THIS IS A MEDICAL STUDENT NOTE TO BE USED FOR EDUCATIONAL PURPOSES ONLY         Patient:    Elida Richter , 80 y.o. female   MRN:  [id number]  Room/Bed:  419/01  Admission Date:   9/29/2023  Code status:  Full Code    Reason for Admission: Puerperal sepsis with AHRF without septic shock    ASSESSMENT AND PLAN:   Problem List Items Addressed This Visit    None  Visit Diagnoses       Puerperal sepsis with acute hypoxic respiratory failure without septic shock ([address])    -  Primary            Acute hypoxic respiratory failure without septic shock  -COVID and influenza nasopharyngeal swabs did not detect SARS or rapid influenza strains AMB sent-patient received 2 DuoNeb treatments in the ER and stated that afterwards she felt much better however when she was ambulated, she desatted down to 59% as per Dr. Rutherford ER resident.  -Chest x-ray 2 views that were done in the ED showed likely minimal by basilar atelectasis  -CTA of the chest with and without contrast showed no evidence of PE  -CBC was unremarkable-did not show white count  -CMP showed GFR of 47  -Patient was afebrile throughout admission however upon exertion, patient's oxygen saturations started to decrease per Dr. Rutherford into the 46s  -Patient was placed on 1 L nasal cannula where she is satting at 94%  -Per daughter patient has decreased p.o. intake over the course of the last 2 weeks when she started having her COVID symptoms and feels weak  -Due to this patient's age, recent COVID stent, frail AT, we felt that this patient would benefit most from hospital admission to increase p.o. intake, optimize her oxygen status upon exertion, and explore options for placement if PT OT recommended.  -We feel that after 24 hours patient does not improve, we then may get the pulmonary team on board.   Plan:  -Daily CBC, daily BMP  -Continuous pulse ox  -Strict I's and O's  -Wean oxygen as tolerated  -PT/OT/case 140 136 - 145 mmol/L    Potassium 3.4 (L) 3.5 - 5.5 mmol/L    Chloride 108 100 - 111 mmol/L    CO2 23 21 - 32 mmol/L    Anion Gap 9 3.0 - 18 mmol/L    Glucose 96 74 - 99 mg/dL    BUN 47 (H) 7.0 - 18 MG/DL    Creatinine 1.79 (H) 0.6 - 1.3 MG/DL    Bun/Cre Ratio 26 (H) 12 - 20      Est, Glom Filt Rate 28 (L) >60 ml/min/1.73m2    Calcium 8.7 8.5 - 10.1 MG/DL   Creatinine, Random Urine    Collection Time: 10/02/23  9:30 PM   Result Value Ref Range    Creatinine, Ur 139.00 (H) 30 - 125 mg/dL   Sodium, urine, random    Collection Time: 10/02/23  9:30 PM   Result Value Ref Range    SODIUM, RANDOM URINE 12 (L) 20 - 110 MMOL/L   Basic Metabolic Panel    Collection Time: 10/03/23 12:28 AM   Result Value Ref Range    Sodium 137 136 - 145 mmol/L    Potassium 3.6 3.5 - 5.5 mmol/L    Chloride 113 (H) 100 - 111 mmol/L    CO2 20 (L) 21 - 32 mmol/L    Anion Gap 4 3.0 - 18 mmol/L    Glucose 128 (H) 74 - 99 mg/dL    BUN 50 (H) 7.0 - 18 MG/DL    Creatinine 1.90 (H) 0.6 - 1.3 MG/DL    Bun/Cre Ratio 26 (H) 12 - 20      Est, Glom Filt Rate 26 (L) >60 ml/min/1.73m2    Calcium 8.6 8.5 - 10.1 MG/DL   CBC    Collection Time: 10/03/23 12:28 AM   Result Value Ref Range    WBC 16.0 (H) 4.6 - 13.2 K/uL    RBC 3.63 (L) 4.20 - 5.30 M/uL    Hemoglobin 9.8 (L) 12.0 - 16.0 g/dL    Hematocrit 32.3 (L) 35.0 - 45.0 %    MCV 89.0 78.0 - 100.0 FL    MCH 27.0 24.0 - 34.0 PG    MCHC 30.3 (L) 31.0 - 37.0 g/dL    RDW 14.5 11.6 - 14.5 %    Platelets 365 416 - 298 K/uL    MPV 9.2 9.2 - 11.8 FL    Nucleated RBCs 0.2 (H) 0  WBC    nRBC 0.03 (H) 0.00 - 0.01 K/uL      [facility] Reference Range & Units 10/02/23 10:22 10/03/23 00:28   Sodium 136 - 145 mmol/L 140 137   Potassium 3.5 - 5.5 mmol/L 3.4 (L) 3.6   Chloride 100 - 111 mmol/L 108 113 (H)   CO2 21 - 32 mmol/L 23 20 (L)   BUN,BUNPL 7.0 - 18 MG/DL 47 (H) 50 (H)   Creatinine 0.6 - 1.3 MG/DL 1.79 (H) 1.90 (H)   Bun/Cre Ratio 12 - 20   26 (H) 26 (H)   Anion Gap 3.0 - 18 mmol/L 9 4   Est, Glom Filt Rate >60

## 2023-10-04 ENCOUNTER — HOME CARE VISIT (OUTPATIENT)
Age: 83
End: 2023-10-04

## 2023-10-04 PROCEDURE — G0299 HHS/HOSPICE OF RN EA 15 MIN: HCPCS

## 2023-10-04 PROCEDURE — 0221000100 HH NO PAY CLAIM PROCEDURE

## 2023-10-05 ASSESSMENT — ENCOUNTER SYMPTOMS
COUGH CHARACTERISTICS: PRODUCTIVE
COUGH: 1
STOOL DESCRIPTION: LOOSE
PAIN LOCATION - PAIN QUALITY: ACHE

## 2023-10-05 NOTE — HOME HEALTH
Home bound verification: SOB with minimal exertion, newly placed on continuous oxygen, poor endurance, poor activity tolerance, abnormal unsteady gait. Skilled Reason for admission/summary of clinical condition:  80year old female post hospitalization for acute respiratory failure with hypoxia, exacerbation COPD, had COVID within recent months. History of COPD, HTN, hyperlipidemia, and anemia  Lives with nephew, daughter lives an hour away. Patient alert and oriented, sob with any exertion, abnormal unsteady gait high fall risk. 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 Appetite fair, states she is drinking 7-8 8oz bottles of water per day. Lungs diminished in bases, clear in upper lobes. Has cane, does not consistently use, and has a step she needs to get out of home which is difficult for her to do per daughter. Admitted to home health for skilled nursing for respiratory disease teaching, and assessment, oxygen safety, medication teaching, energy conservation, fall prevention. PT, and OT evaluations ordered with decline in functional status, poor endurance, abnormal gait. This patient is homebound for the following reasons Requires considerable and taxing effort to leave the home  and Requires the assistance of 1 or more persons to leave the home . Caregiver:  Nephew lives in the home. .  Caregiver assists with meals, safety, transportation    Daughter also assists with transportation , shopping, errands. Medications reconciled and all medications are available in the home this visit. The following education was provided regarding medications: Medication regimen, meds, doses, freuquencies, purpose, importance of adherence   Medications  are effective at this time.       High risk medication teaching regarding anticoagulants, antiplatelets, antibiotics, antipsychotics, hypoglycemic agents,

## 2023-10-06 ENCOUNTER — HOME CARE VISIT (OUTPATIENT)
Age: 83
End: 2023-10-06
Payer: MEDICARE

## 2023-10-06 PROCEDURE — G0151 HHCP-SERV OF PT,EA 15 MIN: HCPCS

## 2023-10-07 NOTE — DISCHARGE SUMMARY
[address] [facility] SUMMARY      Name:   Senia Clancy 80 y.o. female  MRN:   [id number]  CSN:   [id number]  Admission Date:  9/29/2023  Discharge Date:  10/3/2023  Attending:             Dr. Radha Valenzuela  PCP:              Ann Marie Montanez DO   ================================================================  Reason for Admission:  Hypoxia [R09.02]  Oxygen desaturation [R09.02]  Puerperal sepsis with acute hypoxic respiratory failure without septic shock ([address]) [O85, R65.20, J96.01]    Discharge Diagnosis:    Acute Hypoxic Respiratory Failure without Septic Shock  COPD Exacerbation    Follow-up studies/evaluations for PCP/Important Notes to PCP:  Please do a thorough medication reconciliation with this patient as she seemed a little concerned about the medications that she was on   Please follow up with this patient to make sure that she is doing well with her prednisone. She had some concerns that is was making her jittery    Medication reconciliation:  Discontinued Medications: Aspirin, Celicoxib  New Medications: Combivent, Delsym, Prednisone, Zofran    SANDRA Follow Up Appointment:   Follow-up Information       Follow up With Specialties Details Why Contact Info    Kiran Follow up per patient preference [address] DME Services Follow up Your preferred agency. Please call with any questions or concerns about Home Oxygen. [address] [facility]  [phone]    Ann Marie Montanez DO Family Medicine Follow up on 10/4/2023 @ 10:30 patient will be seeing [address]  [phone]               Readmission prevention plan:   Make sure that you take you COPD medications and steroids and use your oxygen as much as possible  The 2L oxygen requirement is a new requirement that you will need to follow for the MD, PGY-1  [facility]  10/6/2023 9:37 PM

## 2023-10-07 NOTE — HOME HEALTH
teach back information provided. PATIENT RESPONSE TO PROCEDURE PERFORMED: Patient exhibiting difficulty in bed mobility, transfers, gait,  steps management and unable safely care for herself. These functional deficits were caused by suraj LE weakness, decreased balance, poor activity tolerance. During this visit, patient presents with decreased bilateral LEs strength, impaired balance and decreased coordination , poor functional activity tolerance and decreased safety in functional mobility skills as evidenced by functional tests. FTSTS: unable, Tinetti and gait test: 19/28, 2 MWT: unable. Patient unsafe to ambulate and  transfers to and from bed, chair, and toilet d/t  suraj weakness on suraj LEs, impaired balance, pain, SOBs with minimal  exertion and  environmental barrier such as O2 tubing. Skilled PT services are necessary to improve bed mobility, transfers, gait, decrease risk of rehospitalization, decrease risk for falls  and restore patient to a level of functionality that patient can perform ADLS and functional mobility safely. Without therapy, patient is at risk for falls, further immobility, dependence upon caregivers and failure to return to PLOF. -  Plan: Patient is seen for initial PT eval today with POC established. Discussed to patient POC and visit frequency of 1w1, 2w4, 1w1. Patient verbalized agreement. HHPT to work on education on pain management techniques, graded therapeutic exercises, balance training, transfers training, HEP ed, education on energy conservation techniques, fall prevention techniques ed, pressure ulcer prevention techniques, gait/steps management training and d/c planning.

## 2023-10-08 VITALS
HEART RATE: 68 BPM | OXYGEN SATURATION: 97 % | DIASTOLIC BLOOD PRESSURE: 74 MMHG | TEMPERATURE: 97.6 F | RESPIRATION RATE: 20 BRPM | SYSTOLIC BLOOD PRESSURE: 148 MMHG

## 2023-10-08 ASSESSMENT — ENCOUNTER SYMPTOMS
SPUTUM COLOR: CLEAR
DYSPNEA ACTIVITY LEVEL: AFTER AMBULATING LESS THAN 10 FT
SPUTUM PRODUCTION: 1

## 2023-10-09 VITALS
OXYGEN SATURATION: 98 % | TEMPERATURE: 97.8 F | SYSTOLIC BLOOD PRESSURE: 132 MMHG | DIASTOLIC BLOOD PRESSURE: 72 MMHG | RESPIRATION RATE: 20 BRPM | HEART RATE: 76 BPM

## 2023-10-10 ENCOUNTER — HOME CARE VISIT (OUTPATIENT)
Age: 83
End: 2023-10-10
Payer: MEDICARE

## 2023-10-10 VITALS
SYSTOLIC BLOOD PRESSURE: 120 MMHG | DIASTOLIC BLOOD PRESSURE: 80 MMHG | RESPIRATION RATE: 20 BRPM | HEART RATE: 72 BPM | OXYGEN SATURATION: 96 % | TEMPERATURE: 97.6 F

## 2023-10-10 PROCEDURE — G2168 SVS BY PT IN HOME HEALTH: HCPCS

## 2023-10-10 NOTE — HOME HEALTH
Patient's Subjective: She reports the couch medicine is making her dizzy, so she is sitting a lot. Falls since last session:   none  Trips to ER since last session?  none  Pain/Discomfort ? none  New Meds:none   Upcoming MD appointments:  none  . Caregiver involvement/assistance needed for: The patient's caregiver assists with some meals, chores, transportation  . Home health supplies by type and quantity ordered/delivered this visit include:  none  . Objective: Pt on 2 liters supplemental oxygen. See interventions. Patient response to treatment: RPE 6-7/10 with vitals remaninig within safe parameters. She denies any pain with performing the 1st 11 exercises of 15 in her exercises. Patient level of understanding of education provided:  -Education for pursed lip breathing with the patient able to return demonstrate. program to improve her activity tolerance. - Use of an AD may allow for correct gait pattern and decrease fall risk. She reports use of one for outdoors. Assess of progress towards goals:   Pt able to tolerate exercises and amb and maintain safe parameters for all vitals. No falls to date. Continued need for the following skills: Pt to wean off of her supplemental oxygen if able by the end of her PT sessions. She will benefit from continued exercises for activity tolerance, strengthening and added stability. Education for breathing, safety to decrease falls. Oxygen safety. Plan for next visit: Educated on the exercises 12-15. Add to her tolerance. Cont with gait training. The following discharge was discussed with the patient/caregiver : Est mated DC ~ 11/6/2023 by Paula Stallings, PT   [facility] HE[facility] required?  Yes

## 2023-10-10 NOTE — PROGRESS NOTES
Physician Progress Note      PATIENT:               Jameel Amaral  CSN #:                  [id number]  :                       1940  ADMIT DATE:       2023 11:40 AM  DISCH DATE:        10/3/2023 4:51 PM  RESPONDING  PROVIDER #:        Olvin Durham MD          QUERY TEXT:    Patient admitted with acute exacerbation of COPD and noted to have elevated   WBC to 22.4 and heart rate >90 bpm. If possible, please document in progress   notes and discharge summary if you are evaluating and/or treating any of the   following: The medical record reflects the following:  Risk Factors: advanced age, COPD, acute respiratory failure, history of colon  cancer, COVID-19 positive 1 week ago  ? Clinical Indicators:  2023, H&P, Dr. Rock Khalil MD:  \"11 y.o. female presented to the   emergency department with cough, decreased appetite in the setting of testing   positive for COVID just over a week ago. Puerperal sepsis with acute hypoxic   respiratory failure without septic shock  ([address]). \"  2023, PN, Hayes Rodriges, RN:  \"Patient placed on O2 via nasal cannula @ 3   lpm with a pulse ox noted of 96%. \"  2023, PN, Dr. Rock Khalil MD:  \"COVID and influenza nasopharyngeal   swabs did not detect SARS or rapid influenza strains AMB sent-patient received   2 DuoNeb treatments in the ER and stated that afterwards she felt much better   however when she was ambulated, she desatted down to 59% as per Dr. Tristen Valencia   ER resident. \"  2023, PN, Dr. Mariana Meier, RN:  \"Patient's room air sat with O2 off   was 93%. Pt's O2 SATs while ambulating dropped to 86%, pt became short of   breath. Pt ambulated back to room, SATs remained 86%. O2 @ 2 liters via nasal    cannula re-applied. O2 SATs 95%. Patient placed on O2 via nasal cannula @ 3   lpm with a pulse ox noted of 96%. \"  2023, PN, Dr. Rock Khalil MD:  \"COVID and influenza nasopharyngeal   swabs did not detect SARS or rapid influenza strains AMB

## 2023-10-11 ENCOUNTER — HOME CARE VISIT (OUTPATIENT)
Age: 83
End: 2023-10-11
Payer: MEDICARE

## 2023-10-11 VITALS
TEMPERATURE: 97.4 F | DIASTOLIC BLOOD PRESSURE: 76 MMHG | HEART RATE: 67 BPM | RESPIRATION RATE: 16 BRPM | SYSTOLIC BLOOD PRESSURE: 113 MMHG | OXYGEN SATURATION: 99 %

## 2023-10-11 PROCEDURE — G0300 HHS/HOSPICE OF LPN EA 15 MIN: HCPCS

## 2023-10-12 ENCOUNTER — HOME CARE VISIT (OUTPATIENT)
Age: 83
End: 2023-10-12
Payer: MEDICARE

## 2023-10-12 VITALS
RESPIRATION RATE: 16 BRPM | HEART RATE: 94 BPM | DIASTOLIC BLOOD PRESSURE: 68 MMHG | TEMPERATURE: 97.4 F | SYSTOLIC BLOOD PRESSURE: 138 MMHG | OXYGEN SATURATION: 97 %

## 2023-10-12 PROCEDURE — G0152 HHCP-SERV OF OT,EA 15 MIN: HCPCS

## 2023-10-12 NOTE — HOME HEALTH
POTENTIAL: Mrs. Jean Rascon presents at her baseline level of independence with ADLs, IADLs, and functional mobility. Pt presents with modified independence with completing ADLs and simple IADLs. Pt has family support to assist with IADLs when necessary. Pt in agreeance with occupational therapy evaluation only with no further skilled home health OT needs at this time. HOME EXERCISE PROGRAM: N/A    Skilled Care Provided: Pt completed full occupational therapy assessment to include balance, coordination, strengthening, ADL education/training, functional mobility and home safety. ASSESSMENT: Pt presents with the ability to complete her daily ADL routine with modified independence to supervision. Pt has all needed adaptive equipment within the home to promote safe completion of tasks. Pt able to complete house hold mobility with no assistive devices and functional transfers needed for ADLs with supervision/SBA. Pt daughter and son has been assisting pt with IADLs however pt able to complete light household chores with supervision. Pt with good BUE strength noted by 4/5 allowing pt to use her BUE strength to aid in functional transfers. Pt with balance deficits however is being addressed with HHPT.       PLAN: D/C 1w1 with continued HHPT and HHSN

## 2023-10-13 ENCOUNTER — HOME CARE VISIT (OUTPATIENT)
Age: 83
End: 2023-10-13
Payer: MEDICARE

## 2023-10-13 PROCEDURE — G0300 HHS/HOSPICE OF LPN EA 15 MIN: HCPCS

## 2023-10-13 PROCEDURE — G0157 HHC PT ASSISTANT EA 15: HCPCS

## 2023-10-14 VITALS
RESPIRATION RATE: 16 BRPM | HEART RATE: 73 BPM | TEMPERATURE: 97.6 F | DIASTOLIC BLOOD PRESSURE: 80 MMHG | SYSTOLIC BLOOD PRESSURE: 148 MMHG | OXYGEN SATURATION: 99 %

## 2023-10-14 NOTE — HOME HEALTH
Patient's Subjective: She reports feeling good. Falls since last session:   no  Trips to ER since last session? no  Pain/Discomfort ? no  New Meds:  no  Upcoming MD appointments: unknown    Caregiver involvement/assistance needed for: The patient's caregiver assists with some meals, chores, transportation. Home health supplies by type and quantity ordered/delivered this visit include:  none  . Objective:  See interventions. Pt not on supplemental oxygen today. Patient response to treatment:  SpO2 > 90% throughput without supplemental oxygen. Patient level of understanding of education provided:  -Education on fall safety/precautions. She reported understanding.   -Safety education for oxygen use. The patient and her daughter reported understanding.   - Education for new exercises and review/demonstration of her prior exercises 1-11. She was able to return demonstrate all. Assess of progress towards goals:   Pt was able to maintain her vitals in safe levels today. She denies any falls. She was able to perform her exercises with good tolerance. Continued need for the following skills: Pt to wean off of her supplemental oxygen if able by the end of her PT sessions. She will benefit from continued exercises for activity tolerance, strengthening and added stability. Education for breathing, safety to decrease falls. Oxygen safety. Plan for next visit:  Community amb    The following discharge was discussed with the patient/caregiver : Est mated DC ~ 11/6/2023 by Piyush Boucher, PT   [address] [facility] required?  Yes

## 2023-10-16 VITALS
SYSTOLIC BLOOD PRESSURE: 148 MMHG | HEART RATE: 80 BPM | DIASTOLIC BLOOD PRESSURE: 85 MMHG | TEMPERATURE: 98 F | RESPIRATION RATE: 16 BRPM | OXYGEN SATURATION: 96 %

## 2023-10-17 ENCOUNTER — HOME CARE VISIT (OUTPATIENT)
Age: 83
End: 2023-10-17
Payer: MEDICARE

## 2023-10-17 VITALS
OXYGEN SATURATION: 100 % | DIASTOLIC BLOOD PRESSURE: 80 MMHG | HEART RATE: 80 BPM | TEMPERATURE: 97.7 F | SYSTOLIC BLOOD PRESSURE: 125 MMHG

## 2023-10-17 PROCEDURE — G0157 HHC PT ASSISTANT EA 15: HCPCS

## 2023-10-17 ASSESSMENT — ENCOUNTER SYMPTOMS: PAIN LOCATION - PAIN QUALITY: ARTHRITIS

## 2023-10-17 NOTE — HOME HEALTH
Patient's Subjective:   HEP: 2-3x/day, but only finished it 1x. Falls since last session:  no  Trips to ER since last session? no  Pain/Discomfort ? Yes, see pain page. Upcoming MD appointments: unknown      Caregiver involvement/assistance needed for: The patient's caregiver assists with some meals, chores, transportation. .  Home health supplies by type and quantity ordered/delivered this visit include: none  . Objective:  See interventions. Patient response to treatment:  SpO2 remained at= 90 % with amb without supplemental oxygen today. Patient level of understanding of education provided:  -Recommended she cont with increased amb, but not more than the distance and time she amb today, to avoid further increasing her BP. Assess of progress towards goals:   She reports compliance with HEP 3x/day. She was able to perform all 15 exercises at 5 reps 1/3 times. Pt able to safely perform a car transfer. She could amb with good tolerance and stability today. She denies any falls. She reports continued arthritic sx in her back, but reports they are minimal. She is being treated for this with meds. Continued need for the following skills: Pt to wean off of her supplemental oxygen if able by the end of her PT sessions. She will benefit from continued exercises for activity tolerance, strengthening and added stability. Education for breathing, safety to decrease falls. Oxygen safety. Plan for next visit:  Tinetti Balance Assessment    The following discharge was discussed with the patient/caregiver : Est mated DC ~ 11/6/2023 by Bina Guerin, PT   [address] [facility] required?  Yes

## 2023-10-19 ENCOUNTER — HOME CARE VISIT (OUTPATIENT)
Age: 83
End: 2023-10-19
Payer: MEDICARE

## 2023-10-19 VITALS
HEART RATE: 67 BPM | OXYGEN SATURATION: 94 % | TEMPERATURE: 97.8 F | SYSTOLIC BLOOD PRESSURE: 140 MMHG | DIASTOLIC BLOOD PRESSURE: 80 MMHG | RESPIRATION RATE: 16 BRPM

## 2023-10-19 VITALS
TEMPERATURE: 97.8 F | DIASTOLIC BLOOD PRESSURE: 80 MMHG | HEART RATE: 72 BPM | RESPIRATION RATE: 16 BRPM | SYSTOLIC BLOOD PRESSURE: 150 MMHG | OXYGEN SATURATION: 100 %

## 2023-10-19 PROCEDURE — G0157 HHC PT ASSISTANT EA 15: HCPCS

## 2023-10-19 PROCEDURE — G0300 HHS/HOSPICE OF LPN EA 15 MIN: HCPCS

## 2023-10-19 NOTE — HOME HEALTH
following skills: Pt to wean off of her supplemental oxygen if able by the end of her PT sessions. She will benefit from continued exercises for activity tolerance, strengthening and added stability. Education for breathing, safety to decrease falls. Oxygen safety. Plan for next visit:   Review exercises    The following discharge was discussed with the patient/caregiver : Est mated DC ~ 11/6/2023 by Pepe Jerry, PT   [address] [facility] required?  Yes

## 2023-10-23 ENCOUNTER — HOME CARE VISIT (OUTPATIENT)
Age: 83
End: 2023-10-23
Payer: MEDICARE

## 2023-10-23 VITALS
SYSTOLIC BLOOD PRESSURE: 120 MMHG | RESPIRATION RATE: 16 BRPM | HEART RATE: 110 BPM | DIASTOLIC BLOOD PRESSURE: 60 MMHG | TEMPERATURE: 98.3 F | OXYGEN SATURATION: 95 %

## 2023-10-23 PROCEDURE — G0157 HHC PT ASSISTANT EA 15: HCPCS

## 2023-10-23 NOTE — HOME HEALTH
Patient's Subjective: She reports being tired. She reports having a later PT session is probably not a good thing for her. She has been off the supplemental oxygen since about 9:45 this a.m. almost 7 hours. Falls since last session:   none  Trips to ER since last session? no  Pain/Discomfort ?  no  New Meds: no  Upcoming MD appointments: unknown  . Caregiver involvement/assistance needed for: The patient's caregiver assists with some meals, chores, transportation. .  Home health supplies by type and quantity ordered/delivered this visit include:  none  . Objective:  See interventions. Patient response to treatment:  Vitals remained within safe ranges throughout. She could tolerate negotiating 1 flight of steps with increased exertion and respiration. She recovered with in 1 minute. Patient level of understanding of education provided:  Fair for her standing exercises and step negotiation. Assess of progress towards goals:  She continues to deny pain or discomfort. No falls to date. Pt is compliant and consistent with her HEP 2-3x/day to her tolerance. She is able to perform most of her activities without supplemental oxygen at this time. Continued need for the following skills: Pt to wean off of her supplemental oxygen if able by the end of her PT sessions. She will benefit from continued exercises for activity tolerance, strengthening and added stability. Education for breathing, safety to decrease falls. Oxygen safety. Plan for next visit:   Community amb without supplemental oxygen. The following discharge was discussed with the patient/caregiver : Est mated DC ~ 11/6/2023 by Paula Stallings, PT   [facility] HEART required?  Yes

## 2023-10-25 ENCOUNTER — HOME CARE VISIT (OUTPATIENT)
Age: 83
End: 2023-10-25
Payer: MEDICARE

## 2023-10-25 VITALS
OXYGEN SATURATION: 96 % | SYSTOLIC BLOOD PRESSURE: 145 MMHG | DIASTOLIC BLOOD PRESSURE: 67 MMHG | TEMPERATURE: 97.1 F | RESPIRATION RATE: 18 BRPM

## 2023-10-25 PROCEDURE — G0300 HHS/HOSPICE OF LPN EA 15 MIN: HCPCS

## 2023-10-26 ENCOUNTER — HOME CARE VISIT (OUTPATIENT)
Age: 83
End: 2023-10-26
Payer: MEDICARE

## 2023-10-26 VITALS
HEART RATE: 82 BPM | SYSTOLIC BLOOD PRESSURE: 126 MMHG | RESPIRATION RATE: 16 BRPM | DIASTOLIC BLOOD PRESSURE: 60 MMHG | TEMPERATURE: 78.8 F | OXYGEN SATURATION: 95 %

## 2023-10-26 PROCEDURE — G0157 HHC PT ASSISTANT EA 15: HCPCS

## 2023-10-26 NOTE — HOME HEALTH
Patient's Subjective: She reports that she is exhausted due to not sleeping well. She is couching too much. Falls since last session:  no  Trips to ER since last session? no  Pain/Discomfort ?  no  New Meds: no  Upcoming MD appointments: Zoom meeting today. .  Caregiver involvement/assistance needed for: The patient's caregiver assists with some meals, chores, transportation. .  Home health supplies by type and quantity ordered/delivered this visit include:  none  . Objective:  See interventions. Patient response to treatment: She was unable to participate in exercises or community amb as planned due to exhaustion. Patient level of understanding of education provided:  - Recommended she discuss with her doctor natural supplemental supplements to assist with her cough and during flu/cold season. She reports that she will. - Do her activity to tolerance. Assess of progress towards goals: None this session due to not feeling well. Continued need for the following skills: Pt to wean off of her supplemental oxygen if able by the end of her PT sessions. She will benefit from continued exercises for activity tolerance, strengthening and added stability. Education for breathing, safety to decrease falls. Oxygen safety. Plan for next visit:   Community amb without supplemental oxygen, if she is feeling better. Prepare for upcoming DC. The following discharge was discussed with the patient/caregiver : Est mated DC ~ 11/6/2023 by Nancy Street, PT   [facility] HEART required?  Yes

## 2023-10-30 ENCOUNTER — HOME CARE VISIT (OUTPATIENT)
Age: 83
End: 2023-10-30
Payer: MEDICARE

## 2023-10-30 VITALS
HEART RATE: 83 BPM | SYSTOLIC BLOOD PRESSURE: 140 MMHG | OXYGEN SATURATION: 97 % | DIASTOLIC BLOOD PRESSURE: 80 MMHG | RESPIRATION RATE: 18 BRPM | TEMPERATURE: 97.8 F

## 2023-10-30 PROCEDURE — G0157 HHC PT ASSISTANT EA 15: HCPCS

## 2023-10-30 NOTE — HOME HEALTH
oxygen, if she is feeling better. Prepare for upcoming DC. The following discharge was discussed with the patient/caregiver : Est mated DC ~ 11/6/2023 by Roscoe Garduno, PT   [facility] HEART required?  Yes

## 2023-11-02 ENCOUNTER — HOME CARE VISIT (OUTPATIENT)
Age: 83
End: 2023-11-02
Payer: MEDICARE

## 2023-11-02 VITALS
WEIGHT: 135.31 LBS | TEMPERATURE: 98.2 F | HEART RATE: 74 BPM | OXYGEN SATURATION: 98 % | SYSTOLIC BLOOD PRESSURE: 138 MMHG | BODY MASS INDEX: 27.33 KG/M2 | DIASTOLIC BLOOD PRESSURE: 64 MMHG

## 2023-11-02 VITALS
SYSTOLIC BLOOD PRESSURE: 140 MMHG | OXYGEN SATURATION: 95 % | HEART RATE: 82 BPM | DIASTOLIC BLOOD PRESSURE: 70 MMHG | TEMPERATURE: 97.2 F

## 2023-11-02 PROCEDURE — G0157 HHC PT ASSISTANT EA 15: HCPCS

## 2023-11-02 PROCEDURE — G0300 HHS/HOSPICE OF LPN EA 15 MIN: HCPCS

## 2023-11-03 NOTE — HOME HEALTH
Patient's Subjective: Pt reports feeling tired and foggy in the head. She says it is from her meds. She was told by the doctor that it may do that. She just took her last dose. She feels like she is getting stronger, though. She also reports that every time she uses the inhaler it makes her cough and she sometimes get some stuff up. Falls since last session: no  Trips to ER since last session? no  Pain/Discomfort ?  no  New Meds: no  .  Caregiver involvement/assistance needed for: The patient's caregiver assists with meals, transportation, chores  . Home health supplies by type and quantity ordered/delivered this visit include:  none  . Assessment and Summary of Care:  Patient's current functional status before discharge is as follows  Strength: Will require reassessed at DC visit. ROM: WFL's  Bed Mobility: MOD I  Transfers: MOD I  Gait: Pt at best: community amb 4 minutes with 1 standing rest break to read SPO2. Oxygen within normal parameters throughout. She is able to demonstrate amb un/level concrete inclined driveway, 1 step front porch close SBA throughout. Stairs:  Pt able to negotiate 1 flight of steps with increased effort. She amb with step to patten holding rail 20% of the time, then step in front of her and a rail the remainder. She amb with close SBA. She amb the stairs with 2 liters oxygen via basal canula. SpO2 remained  within normal parameters . Special Tests:   Tinetti Balance Assessment:   At evaluation: 19/23 medium fall risk. On 11/2/23,  23/28 medium fall risk, indicating improvement in overall stability, but is still medium fall risk. Recommendations: Educated in the need to cont with HEP to tolerance. HEP: COPD protocol, 10-30 reps each exercise to tolerance 2x/day. The following discharge was discussed with the patient/caregiver : Estimated DC ~ 11/6/2023 by Sarabjit Collins, PT   [address] [facility] [address] required?  Yes

## 2023-11-06 ENCOUNTER — HOME CARE VISIT (OUTPATIENT)
Age: 83
End: 2023-11-06
Payer: MEDICARE

## 2023-11-08 NOTE — HOME HEALTH
[facility] education provided: n/a         Patient level of understanding of education provided: patient is able to verbalize understanding         Patient response to procedure performed:  tolerated well, patient did not have oxygen on at time of visit. Agency Progress toward goals: see interventions         Patient's Progress towards personal goals: see interventions         Home exercise program:  Take medications as prescribed, monitor for signs of infection, monitor tolerance of feedings and advance rate per directions, fall precautions, keep all MD appointments. Continued need for the following skills: Nursing. Plan for next visit: continue disease and medication education. Patient and/or caregiver notified and agrees to changes in the Plan of Care: Yes. The following discharge planning was discussed with the pt/caregiver:  Discharge planning as follows: Patient will be discharged once education has completed, patient is medically stable and pt/cg are able to independently manage medications and disease process.

## 2023-11-09 ENCOUNTER — HOME CARE VISIT (OUTPATIENT)
Age: 83
End: 2023-11-09
Payer: MEDICARE

## 2023-11-09 VITALS
RESPIRATION RATE: 14 BRPM | SYSTOLIC BLOOD PRESSURE: 128 MMHG | OXYGEN SATURATION: 95 % | DIASTOLIC BLOOD PRESSURE: 66 MMHG | TEMPERATURE: 97.6 F | HEART RATE: 76 BPM

## 2023-11-09 PROCEDURE — G0300 HHS/HOSPICE OF LPN EA 15 MIN: HCPCS

## 2023-11-09 PROCEDURE — G0151 HHCP-SERV OF PT,EA 15 MIN: HCPCS

## 2023-11-13 VITALS
DIASTOLIC BLOOD PRESSURE: 66 MMHG | SYSTOLIC BLOOD PRESSURE: 128 MMHG | OXYGEN SATURATION: 95 % | TEMPERATURE: 97.6 F | HEART RATE: 88 BPM | RESPIRATION RATE: 14 BRPM

## 2023-11-13 ASSESSMENT — ENCOUNTER SYMPTOMS: PAIN LOCATION - PAIN QUALITY: ACHY

## 2023-11-13 NOTE — HOME HEALTH
During this visit, patient presents with the following clinical findings:  . SUBJECTIVE: Patient denies fall since evaluation. Patient reported she is 90% back to her PLOF. Patient c/o chronic achy   pain on low back  with highest pain level of 2/10 and least pain level of 2.10 for the past 24 hours. Patient manages pain by taking Tylenol as needed, application of cold pack for soreness and achy  type of pain /hot  packs for achy, stiffness type of pain on painful area x 20 mins, positioning and relaxation. Patient reported she last took 1 tab of Tylenol this morning. Abdoul Welsh CAREGIVER ASSISTANCE: No cg present during this visit. Abdoul Welsh MEDICATIONS RECONCILED AND UPDATED: no changes in medications at this time. .  MMT: R hip flex 4+/5   R hip Abd 4+/5   R hip add 4+/5   R hip ext. 4+/5  R knee flex 4+/5  R knee ext. 4+/5    R ankle DF 4+/5  L hip flex 4+/5  L hip Abd 4+/5  L hip ext. 4+/5    L hip add 4+/5  L knee flex 4+/5  L knee ext. 4+/5  L ankle DF 4+/5    FTSTS:  unable. requires suraj UE assistance to safely complete sit to stand. compared to R hip flex 4-/5   R hip Abd 4-/5   R hip add 4-/5   R knee flex 4-/5  R knee ext. 4-/5   R ankle DF 4-/5  L hip flex 4-/5  L hip Abd 4-/5  L hip add 4-/5  L knee flex 4-/5  L knee ext. 4-/5  L ankle DF 4-/5   FTSTS: unable. requires suraj UE push up to complete sit to stand. During initial evaluation. BALANCE: low  risk of falls evidenced by  26/28 on Tinetti balance and gait test  compared to 19/28  during initial evaluation. 2-MINUTE WALK TEST:  280 ft with  RPE of /10, SpO2 ranges from 92-95% at room air,  bpm post task, 86 bpm after 1 minute rest break compared to unable indicative of poor functional activity tolerance. Patient needed sitting rest break after 1 minute of ambulation. Reported modified Stefan scale of 5/10, noted SpO2 96%, HR 91 bpm post task  during initial evaluation. .  BED MOBILITY: indep in bed mobility without cues .    TRANSFERS:  indep in

## 2023-11-18 ENCOUNTER — HOME CARE VISIT (OUTPATIENT)
Age: 83
End: 2023-11-18
Payer: MEDICARE

## 2023-11-24 ENCOUNTER — HOME CARE VISIT (OUTPATIENT)
Age: 83
End: 2023-11-24
Payer: MEDICARE

## 2023-11-24 VITALS
DIASTOLIC BLOOD PRESSURE: 78 MMHG | SYSTOLIC BLOOD PRESSURE: 142 MMHG | OXYGEN SATURATION: 95 % | TEMPERATURE: 97.9 F | RESPIRATION RATE: 18 BRPM | HEART RATE: 76 BPM

## 2023-11-24 PROCEDURE — G0299 HHS/HOSPICE OF RN EA 15 MIN: HCPCS

## 2023-11-28 ASSESSMENT — ENCOUNTER SYMPTOMS
STOOL DESCRIPTION: LOOSE
DYSPNEA ACTIVITY LEVEL: AFTER AMBULATING MORE THAN 20 FT

## 2023-11-28 NOTE — HOME HEALTH
Skilled reason for visit: body systems assess, teach disease process and medication management, d/c teaching    Caregiver involvement: family assists with adl's and iadl's prn. Medications reviewed and all medications are available in the home this visit. The following education was provided regarding medications: take medications as ordered, all medications reviewed: side effects, dosages, purposes, frequencies, do not stop or start any medications without notifying MD, obtain refills prn. MD notified of any discrepancies/look a-like medications/medication interactions: n/a  Medications are effective at this time. Home health supplies by type and quantity ordered/delivered this visit include: n/a    Patient education provided this visit: as above, INSTRUCTED PATIENT AND CG THAT SHOULD ANY NEEDS OR CONCERNS ARISE TO THE DR'S OFFICE OR GO TO AN URGENT CARE CENTER AND NOT TO THE ED FOR NON-LIFE THREATENING EVENTS. IF IT IS LIFE THREATENING THEN CALL 911 OR GO TO THE CLOSEST ER, see care plan. [facility] education provided: n/a    Patient level of understanding of education provided: 100% understanding and teach back    Skilled Care Performed this visit: VS, body systems assessment, teach disease process and medication management, dc teaching    Patient response to procedure performed: receptive to teaching, tolerated assess and teaching without adverse effects noted, eager to be dc \"I'm doing so much better; I don't need Nurses coming anymore to check on me. \"    Agency Progress toward goals: all goals met, see care plan    Patient's Progress towards personal goals: all goals met, see care plan, patient given opportunity to voice questions and/ or concerns. Patient states has no questions or concerns this visit.   Home exercise program: take medications as ordered, follow ordered diet, use assistive devices as instructed by PT, keep all medical appointments    Continued need for the following skills:

## 2023-12-26 ENCOUNTER — HOSPITAL ENCOUNTER (EMERGENCY)
Facility: HOSPITAL | Age: 83
Discharge: HOME | End: 2023-12-27
Payer: MEDICARE

## 2023-12-26 VITALS
WEIGHT: 135 LBS | SYSTOLIC BLOOD PRESSURE: 132 MMHG | HEART RATE: 89 BPM | HEIGHT: 59 IN | RESPIRATION RATE: 16 BRPM | BODY MASS INDEX: 27.21 KG/M2 | TEMPERATURE: 97.8 F | OXYGEN SATURATION: 94 % | DIASTOLIC BLOOD PRESSURE: 77 MMHG

## 2023-12-26 DIAGNOSIS — H91.93 DECREASED HEARING OF BOTH EARS: ICD-10-CM

## 2023-12-26 DIAGNOSIS — H93.13 TINNITUS OF BOTH EARS: Primary | ICD-10-CM

## 2023-12-26 PROCEDURE — 99283 EMERGENCY DEPT VISIT LOW MDM: CPT

## 2023-12-27 PROCEDURE — 6370000000 HC RX 637 (ALT 250 FOR IP): Performed by: PHYSICIAN ASSISTANT

## 2023-12-27 RX ORDER — MECLIZINE HCL 12.5 MG/1
12.5 TABLET ORAL 3 TIMES DAILY PRN
Qty: 15 TABLET | Refills: 0 | Status: SHIPPED | OUTPATIENT
Start: 2023-12-27 | End: 2024-01-06

## 2023-12-27 RX ADMIN — FAMOTIDINE 5 DROP: 20 TABLET, FILM COATED ORAL at 00:48

## 2023-12-27 NOTE — ED PROVIDER NOTES
Rate and Rhythm: Normal rate and regular rhythm. Heart sounds: No murmur heard. Pulmonary:      Effort: Pulmonary effort is normal. No respiratory distress. Breath sounds: Normal breath sounds. No stridor. No wheezing or rhonchi. Musculoskeletal:         General: No deformity or signs of injury. Normal range of motion. Cervical back: Normal range of motion and neck supple. Skin:     General: Skin is warm and dry. Findings: No rash. Neurological:      General: No focal deficit present. Mental Status: She is alert and oriented to person, place, and time. Mental status is at baseline. Motor: No weakness. Coordination: Coordination normal.      Comments: Gait is steady and patient exhibits no evidence of ataxia. Patient is able to ambulate without difficulty. No focal neurological deficit noted. No facial droop, slurred speech, or evidence of altered mentation noted on exam.     Psychiatric:         Mood and Affect: Mood normal.         Behavior: Behavior normal.         Thought Content: Thought content normal.                Diagnostic Study Results     Labs -   No results found for this or any previous visit (from the past 12 hour(s)). Radiologic Studies -   No orders to display     @[facility]@  [unfilled]      Medical Decision Making   I am the first provider for this patient. I reviewed the vital signs, available nursing notes, past medical history, past surgical history, family history and social history. Vital Signs-Reviewed the patient's vital signs. Records Reviewed: Nellie Santiago PA-C     Procedures:  Procedures    Provider Notes (Medical Decision Making): Impression:  tinnitus, vertigo     Patient states she has been experiencing the symptoms intermittently since September after getting COVID. She has not yet seen an ENT doctor. Patient had an ear irrigation performed at bedside.   Will plan to discharge patient with meclizine with recommendation for close

## 2023-12-27 NOTE — ED TRIAGE NOTES
Patient comes in stating that she developed ringing in the ars earlier today. Patient states that it is both ears but the left one started first and is worse. Patient states that it is hard for her to hear out of the left ear. Patient states that she took a nap and when she woke up she felt dizzy.

## (undated) DEVICE — SOLUTION IRRIG 1000ML H2O STRL BLT

## (undated) DEVICE — STERILE POLYISOPRENE POWDER-FREE SURGICAL GLOVES: Brand: PROTEXIS

## (undated) DEVICE — AIRLIFE™ NASAL OXYGEN CANNULA CURVED, FLARED TIP WITH 14 FOOT (4.3 M) CRUSH-RESISTANT TUBING, OVER-THE-EAR STYLE: Brand: AIRLIFE™

## (undated) DEVICE — ENDOSCOPY PUMP TUBING/ CAP SET: Brand: ERBE

## (undated) DEVICE — FLUFF AND POLYMER UNDERPAD,EXTRA HEAVY: Brand: WINGS

## (undated) DEVICE — SYR 50ML SLIP TIP NSAF LF STRL --

## (undated) DEVICE — SYRINGE MED 25GA 3ML L5/8IN SUBQ PLAS W/ DETACH NDL SFTY

## (undated) DEVICE — FCPS RAD JAW 4LC 240CM W/NDL -- BX/20 RADIAL JAW 4

## (undated) DEVICE — Device

## (undated) DEVICE — BITE BLOCK ENDOSCP UNIV AD 6 TO 9.4 MM

## (undated) DEVICE — CATH IV SAFE STR 22GX1IN BLU -- PROTECTIV PLUS

## (undated) DEVICE — GAUZE,SPONGE,4"X4",16PLY,STRL,LF,10/TRAY: Brand: MEDLINE

## (undated) DEVICE — MEDI-VAC NON-CONDUCTIVE SUCTION TUBING: Brand: CARDINAL HEALTH

## (undated) DEVICE — FLEX ADVANTAGE 1500CC: Brand: FLEX ADVANTAGE

## (undated) DEVICE — FLEX ADVANTAGE 3000CC: Brand: FLEX ADVANTAGE

## (undated) DEVICE — CATHETER SUCT TR FL TIP 14FR W/ O CTRL

## (undated) DEVICE — MEDI-VAC SUCTION HIGH CAPACITY: Brand: CARDINAL HEALTH

## (undated) DEVICE — AIRLIFE™ NASAL OXYGEN CANNULA CURVED, NONFLARED TIP WITH 14 FOOT (4.3 M) CRUSH-RESISTANT TUBING, OVER-THE-EAR STYLE: Brand: AIRLIFE™

## (undated) DEVICE — BASIN EMESIS 500CC ROSE 250/CS 60/PLT: Brand: MEDEGEN MEDICAL PRODUCTS, LLC